# Patient Record
Sex: FEMALE | Race: WHITE | NOT HISPANIC OR LATINO | Employment: OTHER | URBAN - METROPOLITAN AREA
[De-identification: names, ages, dates, MRNs, and addresses within clinical notes are randomized per-mention and may not be internally consistent; named-entity substitution may affect disease eponyms.]

---

## 2017-05-24 ENCOUNTER — OFFICE VISIT (OUTPATIENT)
Dept: FAMILY MEDICINE | Facility: CLINIC | Age: 82
End: 2017-05-24
Payer: MEDICARE

## 2017-05-24 VITALS
BODY MASS INDEX: 25.9 KG/M2 | TEMPERATURE: 98 F | WEIGHT: 146.19 LBS | DIASTOLIC BLOOD PRESSURE: 70 MMHG | HEART RATE: 74 BPM | OXYGEN SATURATION: 97 % | SYSTOLIC BLOOD PRESSURE: 140 MMHG | RESPIRATION RATE: 16 BRPM | HEIGHT: 63 IN

## 2017-05-24 DIAGNOSIS — M54.50 CHRONIC MIDLINE LOW BACK PAIN WITHOUT SCIATICA: ICD-10-CM

## 2017-05-24 DIAGNOSIS — K58.0 IRRITABLE BOWEL SYNDROME WITH DIARRHEA: ICD-10-CM

## 2017-05-24 DIAGNOSIS — G89.29 CHRONIC MIDLINE LOW BACK PAIN WITHOUT SCIATICA: ICD-10-CM

## 2017-05-24 DIAGNOSIS — I10 HYPERTENSION, WELL CONTROLLED: Primary | ICD-10-CM

## 2017-05-24 PROCEDURE — 99203 OFFICE O/P NEW LOW 30 MIN: CPT | Mod: PBBFAC,PO | Performed by: FAMILY MEDICINE

## 2017-05-24 PROCEDURE — 99204 OFFICE O/P NEW MOD 45 MIN: CPT | Mod: S$PBB,,, | Performed by: FAMILY MEDICINE

## 2017-05-24 PROCEDURE — 99999 PR PBB SHADOW E&M-NEW PATIENT-LVL III: CPT | Mod: PBBFAC,,, | Performed by: FAMILY MEDICINE

## 2017-05-24 RX ORDER — OXYBUTYNIN CHLORIDE 5 MG/1
5 TABLET, EXTENDED RELEASE ORAL DAILY
Qty: 30 TABLET | Refills: 3 | Status: SHIPPED | OUTPATIENT
Start: 2017-05-24 | End: 2017-07-11

## 2017-05-24 RX ORDER — AMLODIPINE BESYLATE 10 MG/1
10 TABLET ORAL DAILY
COMMUNITY
End: 2017-12-21

## 2017-05-24 NOTE — PROGRESS NOTES
Subjective:       Patient ID: Ana Johnston is a 86 y.o. female.    Chief Complaint: Hypertension (est care); Polyuria; Irritable Bowel Syndrome; Osteoporosis; and Arthritis    HPI    Pt is here today to establish care    HTN - chronic - adherent with her medications - No need of refills.     OA - chronic - Most of which is in her low back, which is reasonably well controlled with prn Tylenol.     IBS - chronic - diarrhea type -  pt takes int lomotil with good effect. Once per week is the average frequency.      Urinary frequency - pt urinates many times per day for many months. Worsening over the last half a year. She was on medicine in the past for this, but she is out. She cannot remember what the name of the medicine.     No current outpatient prescriptions on file prior to visit.     No current facility-administered medications on file prior to visit.        Past Medical History:   Diagnosis Date    Anemia     Anxiety     Arthritis     Cataract     Disorder of kidney and ureter     Encounter for blood transfusion     Hypertension     Osteoporosis     Polyuria     Thyroid disease        Family History   Problem Relation Age of Onset    Arthritis Mother     Thyroid disease Mother     Heart attack Father     Diabetes Father     Liver disease Father     Arthritis Sister     Osteoporosis Sister     Hypertension Sister     Early death Brother     Asthma Son     Tuberculosis Maternal Grandmother     Early death Sister     Thyroid disease Other         reports that she has quit smoking. She does not have any smokeless tobacco history on file. She reports that she drinks about 1.8 oz of alcohol per week . She reports that she does not use drugs.    Review of Systems   Constitutional: Negative for chills and fever.   HENT: Negative for congestion, ear pain, hearing loss, rhinorrhea and sore throat.    Eyes: Negative for pain and discharge.   Respiratory: Negative for cough and shortness of breath.     Cardiovascular: Negative for chest pain and palpitations.   Gastrointestinal: Negative for diarrhea, nausea and vomiting.   Genitourinary: Negative for difficulty urinating, dysuria and frequency.   Allergic/Immunologic: Negative for environmental allergies and food allergies.   Neurological: Negative for seizures and weakness.   Psychiatric/Behavioral: Negative for dysphoric mood. The patient is not nervous/anxious.        Objective:     Vitals:    05/24/17 1413   BP: (!) 140/70   Pulse: 74   Resp: 16   Temp: 98.3 °F (36.8 °C)        Physical Exam   Constitutional: She appears well-developed. No distress.   HENT:   Head: Normocephalic and atraumatic.   Eyes: Conjunctivae are normal. Right eye exhibits no discharge. Left eye exhibits no discharge. No scleral icterus.   Cardiovascular: Normal rate, regular rhythm and normal heart sounds.  Exam reveals no gallop and no friction rub.    No murmur heard.  Pulmonary/Chest: Effort normal and breath sounds normal. No respiratory distress. She has no wheezes. She has no rales.   Neurological: She is alert.   Skin: She is not diaphoretic.   Psychiatric: She has a normal mood and affect.   Vitals reviewed.      Assessment:       1. Hypertension, well controlled    2. Chronic midline low back pain without sciatica    3. Irritable bowel syndrome with diarrhea        Plan:       Ana was seen today for hypertension, polyuria, irritable bowel syndrome, osteoporosis and arthritis.    Diagnoses and all orders for this visit:    Hypertension, well controlled  - Chronic - stable     Pt is doing well on current therapy. No side effects noted. Will continue current therapy.    Chronic midline low back pain without sciatica  - Pt instructed that they can take Tylenol, or acetaminophen for their pain. They can take up to 3,000mg per day, or 6 tablets, with a max of two tablets at a time,  as long as they do not have any liver disease.  Pt to let me know if tylenol is not sufficient for  pain control.     Irritable bowel syndrome with diarrhea  - Chronic - stable     Pt is doing well on current therapy. No side effects noted. Will continue current therapy.    Other orders  -     oxybutynin (DITROPAN-XL) 5 MG TR24; Take 1 tablet (5 mg total) by mouth once daily.  Hx and pex suggests the above dx. Treatment as above. Pt education given during the visit and a patient education handout was given. P t warned about potential dizziness as a side effect.             Return in 4 weeks (on 6/21/2017) for Annual Physical.        Pt verbalized understanding and agreed with our plan.

## 2017-06-02 ENCOUNTER — TELEPHONE (OUTPATIENT)
Dept: FAMILY MEDICINE | Facility: CLINIC | Age: 82
End: 2017-06-02

## 2017-06-02 NOTE — TELEPHONE ENCOUNTER
----- Message from Julissa Trujillo sent at 6/2/2017  4:42 PM CDT -----  Contact: self  Pt is requesting a refill for Sig - Route: Take by mouth 2 (two) times daily. - Oral. Please call pt @ [phone]. Pt is currently out.          Thanks

## 2017-06-22 ENCOUNTER — LAB VISIT (OUTPATIENT)
Dept: LAB | Facility: HOSPITAL | Age: 82
End: 2017-06-22
Attending: FAMILY MEDICINE
Payer: MEDICARE

## 2017-06-22 ENCOUNTER — OFFICE VISIT (OUTPATIENT)
Dept: FAMILY MEDICINE | Facility: CLINIC | Age: 82
End: 2017-06-22
Payer: MEDICARE

## 2017-06-22 VITALS
TEMPERATURE: 98 F | BODY MASS INDEX: 25.27 KG/M2 | WEIGHT: 142.63 LBS | SYSTOLIC BLOOD PRESSURE: 110 MMHG | HEIGHT: 63 IN | HEART RATE: 56 BPM | RESPIRATION RATE: 16 BRPM | OXYGEN SATURATION: 96 % | DIASTOLIC BLOOD PRESSURE: 70 MMHG

## 2017-06-22 DIAGNOSIS — R53.83 FATIGUE, UNSPECIFIED TYPE: ICD-10-CM

## 2017-06-22 DIAGNOSIS — I48.0 AF (PAROXYSMAL ATRIAL FIBRILLATION): ICD-10-CM

## 2017-06-22 DIAGNOSIS — Z00.00 ANNUAL PHYSICAL EXAM: ICD-10-CM

## 2017-06-22 DIAGNOSIS — R23.2 HOT FLASHES: ICD-10-CM

## 2017-06-22 DIAGNOSIS — E21.0 PRIMARY HYPERPARATHYROIDISM: ICD-10-CM

## 2017-06-22 DIAGNOSIS — Z00.00 ANNUAL PHYSICAL EXAM: Primary | ICD-10-CM

## 2017-06-22 DIAGNOSIS — K58.0 IRRITABLE BOWEL SYNDROME WITH DIARRHEA: ICD-10-CM

## 2017-06-22 LAB
ALBUMIN SERPL BCP-MCNC: 3.8 G/DL
ALP SERPL-CCNC: 52 U/L
ALT SERPL W/O P-5'-P-CCNC: 17 U/L
ANION GAP SERPL CALC-SCNC: 9 MMOL/L
AST SERPL-CCNC: 25 U/L
BASOPHILS # BLD AUTO: 0.02 K/UL
BASOPHILS NFR BLD: 0.2 %
BILIRUB SERPL-MCNC: 0.9 MG/DL
BUN SERPL-MCNC: 34 MG/DL
CALCIUM SERPL-MCNC: 10.6 MG/DL
CHLORIDE SERPL-SCNC: 101 MMOL/L
CO2 SERPL-SCNC: 30 MMOL/L
CREAT SERPL-MCNC: 1.1 MG/DL
DIFFERENTIAL METHOD: NORMAL
EOSINOPHIL # BLD AUTO: 0.1 K/UL
EOSINOPHIL NFR BLD: 1.1 %
ERYTHROCYTE [DISTWIDTH] IN BLOOD BY AUTOMATED COUNT: 14.2 %
EST. GFR  (AFRICAN AMERICAN): 52.5 ML/MIN/1.73 M^2
EST. GFR  (NON AFRICAN AMERICAN): 45.6 ML/MIN/1.73 M^2
GLUCOSE SERPL-MCNC: 100 MG/DL
HCT VFR BLD AUTO: 42.1 %
HGB BLD-MCNC: 13.7 G/DL
LYMPHOCYTES # BLD AUTO: 2.1 K/UL
LYMPHOCYTES NFR BLD: 25.7 %
MCH RBC QN AUTO: 30.8 PG
MCHC RBC AUTO-ENTMCNC: 32.5 %
MCV RBC AUTO: 95 FL
MONOCYTES # BLD AUTO: 0.9 K/UL
MONOCYTES NFR BLD: 10.5 %
NEUTROPHILS # BLD AUTO: 5 K/UL
NEUTROPHILS NFR BLD: 62.3 %
PLATELET # BLD AUTO: 236 K/UL
PMV BLD AUTO: 11.5 FL
POTASSIUM SERPL-SCNC: 4.7 MMOL/L
PROT SERPL-MCNC: 7 G/DL
PTH-INTACT SERPL-MCNC: 56 PG/ML
RBC # BLD AUTO: 4.45 M/UL
SODIUM SERPL-SCNC: 140 MMOL/L
TSH SERPL DL<=0.005 MIU/L-ACNC: 0.98 UIU/ML
WBC # BLD AUTO: 8.08 K/UL

## 2017-06-22 PROCEDURE — 99397 PER PM REEVAL EST PAT 65+ YR: CPT | Mod: S$PBB,,, | Performed by: FAMILY MEDICINE

## 2017-06-22 PROCEDURE — 84443 ASSAY THYROID STIM HORMONE: CPT

## 2017-06-22 PROCEDURE — 99999 PR PBB SHADOW E&M-EST. PATIENT-LVL IV: CPT | Mod: PBBFAC,,, | Performed by: FAMILY MEDICINE

## 2017-06-22 PROCEDURE — 36415 COLL VENOUS BLD VENIPUNCTURE: CPT | Mod: PO

## 2017-06-22 PROCEDURE — 83970 ASSAY OF PARATHORMONE: CPT

## 2017-06-22 PROCEDURE — 80053 COMPREHEN METABOLIC PANEL: CPT

## 2017-06-22 PROCEDURE — 85025 COMPLETE CBC W/AUTO DIFF WBC: CPT

## 2017-06-22 RX ORDER — LOSARTAN POTASSIUM AND HYDROCHLOROTHIAZIDE 12.5; 5 MG/1; MG/1
1 TABLET ORAL DAILY
COMMUNITY
End: 2017-08-22 | Stop reason: SDUPTHER

## 2017-06-22 RX ORDER — GLYCERIN 1 G/1
1 SUPPOSITORY RECTAL
COMMUNITY
End: 2019-07-30 | Stop reason: SDUPTHER

## 2017-06-22 RX ORDER — DIPHENOXYLATE HYDROCHLORIDE AND ATROPINE SULFATE 2.5; .025 MG/1; MG/1
1 TABLET ORAL 4 TIMES DAILY PRN
COMMUNITY
End: 2019-01-14

## 2017-06-22 NOTE — PROGRESS NOTES
Subjective:       Patient ID: Ana Johnston is a 86 y.o. female.    Chief Complaint: Annual Exam and Diarrhea (off and on for years )    HPI    'hot flash' - pt experiences this 1-2 x per month x 5-10 seconds. This does not greatly trouble her.     IBS-D - Pt was diagnosed 10+ years ago. Lomotil does help, but alcohol worsens her sxs, but not consistently. This negatively affects her lifestyle.  She drinks a glass of wine about 2-3 x per week.     Positive depression screen - pt denies depressed, but does have rare anxiety. This does not bother her typically. If it does bother her, she will let me know.     Current Outpatient Prescriptions on File Prior to Visit   Medication Sig Dispense Refill    ACETAMINOPHEN (TYLENOL ORAL) Take by mouth 2 (two) times daily as needed.       amlodipine (NORVASC) 10 MG tablet Take 10 mg by mouth once daily.      ATENOLOL ORAL Take 25 mg by mouth 2 (two) times daily.       FOLIC ACID/MULTIVIT-MIN/LUTEIN (CENTRUM SILVER ORAL) Take by mouth once daily at 6am.      GABAPENTIN ORAL Take 300 mg by mouth. Take 1 tablet 1 hours before bedtime      oxybutynin (DITROPAN-XL) 5 MG TR24 Take 1 tablet (5 mg total) by mouth once daily. 30 tablet 3    [DISCONTINUED] COLCHICINE ORAL Take by mouth once daily at 6am.      [DISCONTINUED] LOSARTAN-HYDROCHLOROTHIAZIDE ORAL Take by mouth once daily at 6am.       No current facility-administered medications on file prior to visit.        Past Medical History:   Diagnosis Date    AF (paroxysmal atrial fibrillation) 6/22/2017    Anemia     Anxiety     Arthritis     Cataract     Disorder of kidney and ureter     Encounter for blood transfusion     Hypertension     Osteoporosis     Polyuria     Thyroid disease        Family History   Problem Relation Age of Onset    Arthritis Mother     Thyroid disease Mother     Heart attack Father     Diabetes Father     Liver disease Father     Arthritis Sister     Osteoporosis Sister      Hypertension Sister     Early death Brother     Asthma Son     Tuberculosis Maternal Grandmother     Early death Sister     Thyroid disease Other         reports that she has quit smoking. She does not have any smokeless tobacco history on file. She reports that she drinks about 1.8 oz of alcohol per week . She reports that she does not use drugs.    Review of Systems   Constitutional: Negative for chills and fever.   HENT: Negative for congestion, ear pain, hearing loss, rhinorrhea and sore throat.    Eyes: Negative for pain and discharge.   Respiratory: Negative for cough and shortness of breath.    Cardiovascular: Negative for chest pain and palpitations.   Gastrointestinal: Positive for diarrhea. Negative for nausea and vomiting.   Genitourinary: Negative for difficulty urinating, dysuria and frequency.   Allergic/Immunologic: Negative for environmental allergies and food allergies.   Neurological: Negative for seizures and weakness.   Psychiatric/Behavioral: Negative for dysphoric mood. The patient is not nervous/anxious.        Objective:     Vitals:    06/22/17 1032   BP: 110/70   Pulse: (!) 56   Resp: 16   Temp: 97.7 °F (36.5 °C)        Physical Exam   Constitutional: She appears well-developed. No distress.   HENT:   Head: Normocephalic and atraumatic.   Eyes: Conjunctivae are normal. Right eye exhibits no discharge. Left eye exhibits no discharge. No scleral icterus.   Cardiovascular: Normal rate, regular rhythm and normal heart sounds.  Exam reveals no gallop and no friction rub.    No murmur heard.  Pulmonary/Chest: Effort normal and breath sounds normal. No respiratory distress. She has no wheezes. She has no rales.   Musculoskeletal: She exhibits edema (trace bilat).   Neurological: She is alert.   antalgic gait, walks with rolling walker.    Skin: She is not diaphoretic.   Psychiatric: She has a normal mood and affect.   Vitals reviewed.      Assessment:       1. Annual physical exam    2.  Irritable bowel syndrome with diarrhea    3. AF (paroxysmal atrial fibrillation)    4. Primary hyperparathyroidism    5. Hot flashes    6. Fatigue, unspecified type        Plan:       Ana was seen today for annual exam and diarrhea.    Diagnoses and all orders for this visit:    Annual physical exam  -     Comprehensive metabolic panel; Future  Counseled on age appropriate medical preventative services, including age appropriate cancer screenings, over all nutritional health, need for a consistent exercise regimen and an over all push towards maintaining a vigorous and active lifestyle.      Counseled on age appropriate vaccines and discussed upcoming health care needs based on age/gender.  Spent time with patient counseling on need for a good patient/doctor relationship moving forward.        PT will inquire about vaccines. By our records she needs prevnar 13.      Irritable bowel syndrome with diarrhea  Advised cessation of alcohol as this may be increasing her diarrhea.     AF (paroxysmal atrial fibrillation)  -     Ambulatory referral to Cardiology    Primary hyperparathyroidism  -     CBC auto differential; Future  -     TSH; Future  -     PTH, intact; Future    Hot flashes  -     TSH; Future    Fatigue, unspecified type  -     TSH; Future            Return in about 3 months (around 9/22/2017) for diarrhea,.      Pt verbalized understanding and agreed with our plan.

## 2017-06-23 ENCOUNTER — TELEPHONE (OUTPATIENT)
Dept: FAMILY MEDICINE | Facility: CLINIC | Age: 82
End: 2017-06-23

## 2017-06-23 DIAGNOSIS — E83.52 HYPERCALCEMIA: Primary | ICD-10-CM

## 2017-06-23 NOTE — TELEPHONE ENCOUNTER
----- Message from Tc Lemus MD sent at 6/23/2017  9:23 AM CDT -----  Please notify patient results are abnormal as her calcium was a bit high. Please have her come in for rechek  Lab only in 2 weeks.  Thanks.

## 2017-07-05 ENCOUNTER — OFFICE VISIT (OUTPATIENT)
Dept: CARDIOLOGY | Facility: CLINIC | Age: 82
End: 2017-07-05
Payer: MEDICARE

## 2017-07-05 VITALS
WEIGHT: 137.56 LBS | HEART RATE: 70 BPM | HEIGHT: 63 IN | OXYGEN SATURATION: 97 % | SYSTOLIC BLOOD PRESSURE: 134 MMHG | DIASTOLIC BLOOD PRESSURE: 72 MMHG | BODY MASS INDEX: 24.38 KG/M2

## 2017-07-05 DIAGNOSIS — I10 ESSENTIAL HYPERTENSION: ICD-10-CM

## 2017-07-05 DIAGNOSIS — I10 HYPERTENSION: ICD-10-CM

## 2017-07-05 DIAGNOSIS — I48.0 AF (PAROXYSMAL ATRIAL FIBRILLATION): Primary | ICD-10-CM

## 2017-07-05 PROCEDURE — 1159F MED LIST DOCD IN RCRD: CPT | Mod: ,,, | Performed by: INTERNAL MEDICINE

## 2017-07-05 PROCEDURE — 99999 PR PBB SHADOW E&M-EST. PATIENT-LVL III: CPT | Mod: PBBFAC,,, | Performed by: INTERNAL MEDICINE

## 2017-07-05 PROCEDURE — 1126F AMNT PAIN NOTED NONE PRSNT: CPT | Mod: ,,, | Performed by: INTERNAL MEDICINE

## 2017-07-05 PROCEDURE — 99204 OFFICE O/P NEW MOD 45 MIN: CPT | Mod: S$PBB,,, | Performed by: INTERNAL MEDICINE

## 2017-07-05 PROCEDURE — 99213 OFFICE O/P EST LOW 20 MIN: CPT | Mod: PBBFAC | Performed by: INTERNAL MEDICINE

## 2017-07-05 PROCEDURE — 93005 ELECTROCARDIOGRAM TRACING: CPT | Mod: PBBFAC | Performed by: INTERNAL MEDICINE

## 2017-07-05 PROCEDURE — 93010 ELECTROCARDIOGRAM REPORT: CPT | Mod: S$PBB,,, | Performed by: INTERNAL MEDICINE

## 2017-07-05 RX ORDER — ASPIRIN 325 MG
325 TABLET ORAL DAILY
Refills: 0 | COMMUNITY
Start: 2017-07-05 | End: 2019-07-30 | Stop reason: SDUPTHER

## 2017-07-05 NOTE — LETTER
July 5, 2017      Tc Lemus MD  [address] [facility] [address]           [place] [facility] [address] [facility] [place] [address]  Phone: [phone]          Patient: Ana Johnston   MR Number: [id number]   YOB: 1930   Date of Visit: 7/5/2017       Dear Dr. Tc Lemus:    Thank you for referring Ana Johnston to me for evaluation. Attached you will find relevant portions of my assessment and plan of care.    If you have questions, please do not hesitate to call me. I look forward to following Ana Johnston along with you.    Sincerely,    Junior Garcia MD    Enclosure  CC:  No Recipients    If you would like to receive this communication electronically, please contact [email or web address] or [phone] to request more information on [facility] access.    For providers and/or their staff who would like to refer a patient to [facility], please contact us through our one-stop-shop provider referral line, [facility], at [phone].    If you feel you have received this communication in error or would no longer like to receive these types of communications, please e-mail [email or web address]

## 2017-07-05 NOTE — PROGRESS NOTES
Subjective:    Patient ID:  Ana Johnston is a 86 y.o. female who presents for follow-up of Atrial Fibrillation      HPI   HTN, PAF    Recently moved from [place]  Referred by Dr Lemus  'hot flash' - pt experiences this 1-2 x per month x 5-10 seconds. This does not greatly trouble her.      IBS-D - Pt was diagnosed 10+ years ago. Lomotil does help, but alcohol worsens her sxs, but not consistently. This negatively affects her lifestyle.  She drinks a glass of wine about 2-3 x per week.      Positive depression screen - pt denies depressed, but does have rare anxiety. This does not bother her typically. If it does bother her, she will let me know.     Denies CP or SOB  Reports PAF was Dx about 4-5 years ago. Old records show holter 2013 with A-fib  Denies being on OAC    EKG NSR RBBB PVCs       Review of Systems   Constitution: Negative for decreased appetite.   HENT: Negative for ear discharge.    Eyes: Negative for blurred vision.   Respiratory: Negative for hemoptysis.    Endocrine: Negative for polyphagia.   Hematologic/Lymphatic: Negative for adenopathy.   Skin: Negative for color change.   Musculoskeletal: Negative for joint swelling.   Neurological: Negative for brief paralysis.   Psychiatric/Behavioral: Negative for hallucinations.        Objective:    Physical Exam   Constitutional: She is oriented to person, place, and time. She appears well-developed and well-nourished.   HENT:   Head: Normocephalic and atraumatic.   Eyes: Conjunctivae are normal. Pupils are equal, round, and reactive to light.   Neck: Normal range of motion. Neck supple.   Cardiovascular: Normal rate, normal heart sounds and intact distal pulses.    Pulmonary/Chest: Effort normal and breath sounds normal.   Abdominal: Soft. Bowel sounds are normal.   Musculoskeletal: Normal range of motion.   Neurological: She is alert and oriented to person, place, and time.   Skin: Skin is warm and dry.         Assessment:       1. AF  (paroxysmal atrial fibrillation)    2. Essential hypertension         Plan:       Discussed OAC - she would prefer  qd alone  Echo and holter  Currently NSR with PVCs

## 2017-07-07 ENCOUNTER — LAB VISIT (OUTPATIENT)
Dept: LAB | Facility: HOSPITAL | Age: 82
End: 2017-07-07
Attending: FAMILY MEDICINE
Payer: MEDICARE

## 2017-07-07 DIAGNOSIS — E83.52 HYPERCALCEMIA: ICD-10-CM

## 2017-07-07 LAB
CA-I BLDV-SCNC: 1.33 MMOL/L
CALCIUM SERPL-MCNC: 10.2 MG/DL

## 2017-07-07 PROCEDURE — 82330 ASSAY OF CALCIUM: CPT

## 2017-07-07 PROCEDURE — 36415 COLL VENOUS BLD VENIPUNCTURE: CPT | Mod: PO

## 2017-07-07 PROCEDURE — 82310 ASSAY OF CALCIUM: CPT

## 2017-07-10 ENCOUNTER — TELEPHONE (OUTPATIENT)
Dept: FAMILY MEDICINE | Facility: CLINIC | Age: 82
End: 2017-07-10

## 2017-07-10 NOTE — TELEPHONE ENCOUNTER
----- Message from Juan Pope sent at 7/10/2017  2:36 PM CDT -----  Contact: Son-Antolin   Pt's son has questions regarding an after visit summary pt received on 6/22 visit. Son can be reached @ [phone].

## 2017-07-10 NOTE — TELEPHONE ENCOUNTER
Patient son called stated pt is not getting better with loose stool. Patient scheduled for tomorrow

## 2017-07-11 ENCOUNTER — OFFICE VISIT (OUTPATIENT)
Dept: FAMILY MEDICINE | Facility: CLINIC | Age: 82
End: 2017-07-11
Payer: MEDICARE

## 2017-07-11 VITALS
BODY MASS INDEX: 25.39 KG/M2 | TEMPERATURE: 98 F | DIASTOLIC BLOOD PRESSURE: 80 MMHG | RESPIRATION RATE: 16 BRPM | HEART RATE: 64 BPM | HEIGHT: 63 IN | WEIGHT: 143.31 LBS | SYSTOLIC BLOOD PRESSURE: 150 MMHG | OXYGEN SATURATION: 96 %

## 2017-07-11 DIAGNOSIS — K58.0 IRRITABLE BOWEL SYNDROME WITH DIARRHEA: Primary | ICD-10-CM

## 2017-07-11 DIAGNOSIS — Z13.6 SCREENING FOR CARDIOVASCULAR CONDITION: ICD-10-CM

## 2017-07-11 DIAGNOSIS — R35.89 POLYURIA: ICD-10-CM

## 2017-07-11 PROCEDURE — 99214 OFFICE O/P EST MOD 30 MIN: CPT | Mod: PBBFAC,PO | Performed by: FAMILY MEDICINE

## 2017-07-11 PROCEDURE — 1159F MED LIST DOCD IN RCRD: CPT | Mod: ,,, | Performed by: FAMILY MEDICINE

## 2017-07-11 PROCEDURE — 99214 OFFICE O/P EST MOD 30 MIN: CPT | Mod: S$PBB,,, | Performed by: FAMILY MEDICINE

## 2017-07-11 PROCEDURE — 1126F AMNT PAIN NOTED NONE PRSNT: CPT | Mod: ,,, | Performed by: FAMILY MEDICINE

## 2017-07-11 PROCEDURE — 99999 PR PBB SHADOW E&M-EST. PATIENT-LVL IV: CPT | Mod: PBBFAC,,, | Performed by: FAMILY MEDICINE

## 2017-07-11 RX ORDER — OXYBUTYNIN CHLORIDE 15 MG/1
15 TABLET, EXTENDED RELEASE ORAL DAILY
Qty: 30 TABLET | Refills: 1 | Status: SHIPPED | OUTPATIENT
Start: 2017-07-11 | End: 2017-07-25

## 2017-07-11 NOTE — PROGRESS NOTES
Subjective:       Patient ID: Ana Johnston is a 86 y.o. female.    Chief Complaint: Diarrhea    HPI    IBS - D - She is taking lomotil about 3x per day to treat her sxs. Usually by time she takes the 3rd lomotil after her third diarrhea episode, she no longer has any episodes for the remainder of the day.     Polyuria - Pt has increasing problems with polyuria.  She is on oxybutinin 5mg, but this has not made any difference thatt she can tell. She states that she urinates multiple times per hour.     She currently get up 4-5 x at night to urinate.     Her old urologist gave her an unknown medication sample which seemed to work well.       Current Outpatient Prescriptions on File Prior to Visit   Medication Sig Dispense Refill    ACETAMINOPHEN (TYLENOL ORAL) Take by mouth 2 (two) times daily as needed.       amlodipine (NORVASC) 10 MG tablet Take 10 mg by mouth once daily.      aspirin 325 MG tablet Take 1 tablet (325 mg total) by mouth once daily.  0    ATENOLOL ORAL Take 25 mg by mouth 2 (two) times daily.       diphenoxylate-atropine 2.5-0.025 mg (LOMOTIL) 2.5-0.025 mg per tablet Take 1 tablet by mouth 4 (four) times daily as needed for Diarrhea.      FOLIC ACID/MULTIVIT-MIN/LUTEIN (CENTRUM SILVER ORAL) Take by mouth once daily at 6am.      GABAPENTIN ORAL Take 300 mg by mouth. Take 1 tablet 1 hours before bedtime      losartan-hydrochlorothiazide 50-12.5 mg (HYZAAR) 50-12.5 mg per tablet Take 1 tablet by mouth once daily.      [DISCONTINUED] oxybutynin (DITROPAN-XL) 5 MG TR24 Take 1 tablet (5 mg total) by mouth once daily. 30 tablet 3    glycerin adult suppository Place 1 suppository rectally as needed for Constipation.       No current facility-administered medications on file prior to visit.        Past Medical History:   Diagnosis Date    AF (paroxysmal atrial fibrillation) 6/22/2017    Anemia     Anxiety     Arthritis     Cataract     Disorder of kidney and ureter     Encounter for blood  transfusion     Hypertension     Osteoporosis     Polyuria     Thyroid disease        Family History   Problem Relation Age of Onset    Arthritis Mother     Thyroid disease Mother     Heart attack Father     Diabetes Father     Liver disease Father     Arthritis Sister     Osteoporosis Sister     Hypertension Sister     Early death Brother     Asthma Son     Tuberculosis Maternal Grandmother     Early death Sister     Thyroid disease Other         reports that she has quit smoking. She has quit using smokeless tobacco. She reports that she drinks about 1.8 oz of alcohol per week . She reports that she does not use drugs.    Review of Systems   Gastrointestinal: Positive for abdominal pain and diarrhea.   Genitourinary: Positive for frequency and urgency. Negative for dysuria.   Musculoskeletal: Positive for back pain and gait problem.       Objective:     Vitals:    07/11/17 1608   BP: (!) 150/80   Pulse: 64   Resp: 16   Temp: 98.2 °F (36.8 °C)        Physical Exam   Constitutional: She appears well-developed. No distress.   HENT:   Head: Normocephalic and atraumatic.   [place]   Eyes: Conjunctivae are normal. No scleral icterus.   Pulmonary/Chest: Effort normal.   Neurological: She is alert.   Kyphotic with unstable gait, uses rolling walker.    Skin: She is not diaphoretic.   Psychiatric: She has a normal mood and affect. Her behavior is normal.   Vitals reviewed.      Assessment:       1. Irritable bowel syndrome with diarrhea    2. Polyuria    3. Screening for cardiovascular condition        Plan:       Ana was seen today for diarrhea.    Diagnoses and all orders for this visit:    Irritable bowel syndrome with diarrhea  -     Ambulatory consult to Gastroenterology   Will try a few changes:  1. Handout given for FODMAP reduction diet.   2. Taking lomotil at breakfast and Lunch as ppx instead of reactive.  3. Refer to GI.     Polyuria  -     oxybutynin (DITROPAN XL) 15 MG TR24; Take 1 tablet (15 mg  total) by mouth once daily.  Pt still has significant polyuria. Will increase ditropan from 5->15mg.     Her son will call her old urologist and figure out which medication he was giving her samples for which seemed to work well.     Screening for cardiovascular condition  -     Lipid panel; Future            Return in about 3 weeks (around 8/1/2017).        Pt verbalized understanding and agreed with our plan.

## 2017-07-19 ENCOUNTER — TELEPHONE (OUTPATIENT)
Dept: FAMILY MEDICINE | Facility: CLINIC | Age: 82
End: 2017-07-19

## 2017-07-19 ENCOUNTER — PATIENT MESSAGE (OUTPATIENT)
Dept: FAMILY MEDICINE | Facility: CLINIC | Age: 82
End: 2017-07-19

## 2017-07-19 DIAGNOSIS — R35.0 URINARY FREQUENCY: Primary | ICD-10-CM

## 2017-07-19 NOTE — TELEPHONE ENCOUNTER
----- Message from Monserrat Dyer sent at 7/18/2017  4:22 PM CDT -----  Contact: Son - Antolin Johnston  Patient's son says he need to discuss patient's care and referrals for other doctors with the doctor. Please call  Antolin at  [phone]

## 2017-07-19 NOTE — TELEPHONE ENCOUNTER
pts son states he was out of town for last OV and wants to know if she needs to go to upcoming appointments; I informed him those appointments were for cardiac testing and f/u with cardiologist, he verbalized understanding; also states referral was supposed to be place for GI; informed him referral was placed, I will follow-up and have them call him to schedule appointment; I placed call to [facility] and was told they did not receive referral; I faxed referral to [phone] per there request

## 2017-07-25 ENCOUNTER — TELEPHONE (OUTPATIENT)
Dept: FAMILY MEDICINE | Facility: CLINIC | Age: 82
End: 2017-07-25

## 2017-07-25 ENCOUNTER — PATIENT MESSAGE (OUTPATIENT)
Dept: FAMILY MEDICINE | Facility: CLINIC | Age: 82
End: 2017-07-25

## 2017-07-25 RX ORDER — SOLIFENACIN SUCCINATE 10 MG/1
10 TABLET, FILM COATED ORAL DAILY
Qty: 30 TABLET | Refills: 1 | Status: SHIPPED | OUTPATIENT
Start: 2017-07-25 | End: 2017-08-22

## 2017-07-25 NOTE — TELEPHONE ENCOUNTER
----- Message from Juan Pope sent at 7/25/2017 11:15 AM CDT -----  Contact: [facility]Tacy G[facility]  Called to request copy of pt's recent labs. Please fax to [phone].

## 2017-07-26 ENCOUNTER — HOSPITAL ENCOUNTER (OUTPATIENT)
Dept: CARDIOLOGY | Facility: HOSPITAL | Age: 82
Discharge: HOME | End: 2017-07-26
Attending: INTERNAL MEDICINE
Payer: MEDICARE

## 2017-07-26 DIAGNOSIS — I10 ESSENTIAL HYPERTENSION: ICD-10-CM

## 2017-07-26 DIAGNOSIS — I48.0 AF (PAROXYSMAL ATRIAL FIBRILLATION): ICD-10-CM

## 2017-07-26 LAB
DIASTOLIC DYSFUNCTION: YES
ESTIMATED PA SYSTOLIC PRESSURE: 64.55
GLOBAL PERICARDIAL EFFUSION: ABNORMAL
MITRAL VALVE MOBILITY: NORMAL
MITRAL VALVE REGURGITATION: ABNORMAL
RETIRED EF AND QEF - SEE NOTES: 55 (ref 55–65)
TRICUSPID VALVE REGURGITATION: ABNORMAL

## 2017-07-26 PROCEDURE — 93306 TTE W/DOPPLER COMPLETE: CPT

## 2017-07-26 PROCEDURE — 93225 XTRNL ECG REC<48 HRS REC: CPT

## 2017-07-26 PROCEDURE — 93306 TTE W/DOPPLER COMPLETE: CPT | Mod: 26,,, | Performed by: INTERNAL MEDICINE

## 2017-07-26 PROCEDURE — 93227 XTRNL ECG REC<48 HR R&I: CPT | Mod: ,,, | Performed by: INTERNAL MEDICINE

## 2017-07-28 ENCOUNTER — PATIENT MESSAGE (OUTPATIENT)
Dept: FAMILY MEDICINE | Facility: CLINIC | Age: 82
End: 2017-07-28

## 2017-08-02 ENCOUNTER — OFFICE VISIT (OUTPATIENT)
Dept: CARDIOLOGY | Facility: CLINIC | Age: 82
End: 2017-08-02
Payer: MEDICARE

## 2017-08-02 VITALS
BODY MASS INDEX: 25.16 KG/M2 | DIASTOLIC BLOOD PRESSURE: 70 MMHG | WEIGHT: 142 LBS | HEART RATE: 53 BPM | SYSTOLIC BLOOD PRESSURE: 129 MMHG | OXYGEN SATURATION: 95 % | HEIGHT: 63 IN

## 2017-08-02 DIAGNOSIS — I10 HYPERTENSION, WELL CONTROLLED: ICD-10-CM

## 2017-08-02 DIAGNOSIS — I48.0 AF (PAROXYSMAL ATRIAL FIBRILLATION): Primary | ICD-10-CM

## 2017-08-02 PROCEDURE — 1159F MED LIST DOCD IN RCRD: CPT | Mod: ,,, | Performed by: INTERNAL MEDICINE

## 2017-08-02 PROCEDURE — 99999 PR PBB SHADOW E&M-EST. PATIENT-LVL III: CPT | Mod: PBBFAC,,, | Performed by: INTERNAL MEDICINE

## 2017-08-02 PROCEDURE — 99213 OFFICE O/P EST LOW 20 MIN: CPT | Mod: PBBFAC | Performed by: INTERNAL MEDICINE

## 2017-08-02 PROCEDURE — 99213 OFFICE O/P EST LOW 20 MIN: CPT | Mod: S$PBB,,, | Performed by: INTERNAL MEDICINE

## 2017-08-02 PROCEDURE — 1126F AMNT PAIN NOTED NONE PRSNT: CPT | Mod: ,,, | Performed by: INTERNAL MEDICINE

## 2017-08-02 NOTE — PROGRESS NOTES
Subjective:    Patient ID:  Ana Johnston is a 86 y.o. female who presents for follow-up of Atrial Fibrillation      HPI     HTN, PAF -  alone at patient request     Recently moved from [place]  Referred by Dr Lemus  'hot flash' - pt experiences this 1-2 x per month x 5-10 seconds. This does not greatly trouble her.      IBS-D - Pt was diagnosed 10+ years ago. Lomotil does help, but alcohol worsens her sxs, but not consistently. This negatively affects her lifestyle.  She drinks a glass of wine about 2-3 x per week.      Positive depression screen - pt denies depressed, but does have rare anxiety. This does not bother her typically. If it does bother her, she will let me know.      Denies CP or SOB  Reports PAF was Dx about 4-5 years ago. Old records show holter 2013 with A-fib    Discussed OAC - she would prefer  qd alone    Echo 7/26/17    1 - Normal left ventricular systolic function (EF 55-60%).     2 - No wall motion abnormalities.     3 - Concentric hypertrophy.     4 - Impaired LV relaxation, elevated LAP (grade 2 diastolic dysfunction).     5 - Trivial to mild mitral regurgitation (eccentric, anteriorly directed jet, degree of MR may be underestimated).     6 - Mild tricuspid regurgitation.     7 - Pulmonary hypertension. The estimated PA systolic pressure is 65 mmHg.     Holter 7/26/17  1. Sinus rhythm with heart rates varying between 34 and 126 bpm with an average of 67 bpm.     VENTRICULAR ARRHYTHMIAS  1. There were very frequent PVCs totalling 19921 and averaging 463 per hour.     2. There were no episodes of ventricular tachycardia.    SUPRA VENTRICULAR ARRHYTHMIAS  1. There were very frequent PACs totalling 5238 and averaging 218 per hour.     2. There were no episodes of sustained supraventricular tachycardia.    SINUS NODE FUNCTION  1. There was no evidence of high grade SA desean block.     AV CONDUCTION  1. There was no evidence of high grade AV block.     Denies CP or  SOB  Denies palpitations    Review of Systems   Constitution: Negative for decreased appetite.   HENT: Negative for ear discharge.    Eyes: Negative for blurred vision.   Respiratory: Negative for hemoptysis.    Endocrine: Negative for polyphagia.   Hematologic/Lymphatic: Negative for adenopathy.   Skin: Negative for color change.   Musculoskeletal: Negative for joint swelling.   Neurological: Negative for brief paralysis.   Psychiatric/Behavioral: Negative for hallucinations.        Objective:    Physical Exam   Constitutional: She is oriented to person, place, and time. She appears well-developed and well-nourished.   HENT:   Head: Normocephalic and atraumatic.   Eyes: Conjunctivae are normal. Pupils are equal, round, and reactive to light.   Neck: Normal range of motion. Neck supple.   Cardiovascular: Normal rate, normal heart sounds and intact distal pulses.    Pulmonary/Chest: Effort normal and breath sounds normal.   Abdominal: Soft. Bowel sounds are normal.   Musculoskeletal: Normal range of motion.   Neurological: She is alert and oriented to person, place, and time.   Skin: Skin is warm and dry.         Assessment:       1. AF (paroxysmal atrial fibrillation)    2. Hypertension, well controlled         Plan:       Cardiac stable  Frequent PVCs and PACs but asymptomatic with good LV function  OV 6 months

## 2017-08-22 ENCOUNTER — OFFICE VISIT (OUTPATIENT)
Dept: FAMILY MEDICINE | Facility: CLINIC | Age: 82
End: 2017-08-22
Payer: MEDICARE

## 2017-08-22 VITALS
RESPIRATION RATE: 16 BRPM | HEART RATE: 76 BPM | DIASTOLIC BLOOD PRESSURE: 70 MMHG | SYSTOLIC BLOOD PRESSURE: 156 MMHG | HEIGHT: 63 IN | BODY MASS INDEX: 25.43 KG/M2 | OXYGEN SATURATION: 95 % | TEMPERATURE: 99 F | WEIGHT: 143.5 LBS

## 2017-08-22 DIAGNOSIS — K59.01 SLOW TRANSIT CONSTIPATION: ICD-10-CM

## 2017-08-22 DIAGNOSIS — M15.9 GENERALIZED OA: ICD-10-CM

## 2017-08-22 DIAGNOSIS — R41.3 MEMORY LOSS: ICD-10-CM

## 2017-08-22 DIAGNOSIS — N32.81 OAB (OVERACTIVE BLADDER): Primary | ICD-10-CM

## 2017-08-22 DIAGNOSIS — F41.9 ANXIETY: ICD-10-CM

## 2017-08-22 DIAGNOSIS — F43.20 ADJUSTMENT DISORDER, UNSPECIFIED TYPE: ICD-10-CM

## 2017-08-22 PROCEDURE — 1126F AMNT PAIN NOTED NONE PRSNT: CPT | Mod: ,,, | Performed by: FAMILY MEDICINE

## 2017-08-22 PROCEDURE — 99215 OFFICE O/P EST HI 40 MIN: CPT | Mod: S$PBB,,, | Performed by: FAMILY MEDICINE

## 2017-08-22 PROCEDURE — 99999 PR PBB SHADOW E&M-EST. PATIENT-LVL III: CPT | Mod: PBBFAC,,, | Performed by: FAMILY MEDICINE

## 2017-08-22 PROCEDURE — 99213 OFFICE O/P EST LOW 20 MIN: CPT | Mod: PBBFAC,PO | Performed by: FAMILY MEDICINE

## 2017-08-22 PROCEDURE — 1159F MED LIST DOCD IN RCRD: CPT | Mod: ,,, | Performed by: FAMILY MEDICINE

## 2017-08-22 RX ORDER — SERTRALINE HYDROCHLORIDE 50 MG/1
50 TABLET, FILM COATED ORAL DAILY
Qty: 30 TABLET | Refills: 1 | Status: SHIPPED | OUTPATIENT
Start: 2017-08-22 | End: 2017-10-08 | Stop reason: SDUPTHER

## 2017-08-22 RX ORDER — LOSARTAN POTASSIUM AND HYDROCHLOROTHIAZIDE 12.5; 5 MG/1; MG/1
TABLET ORAL
Qty: 30 TABLET | Refills: 2 | Status: SHIPPED | OUTPATIENT
Start: 2017-08-22 | End: 2017-11-29 | Stop reason: SDUPTHER

## 2017-08-22 NOTE — PROGRESS NOTES
Subjective:       Patient ID: Ana Johnston is a 86 y.o. female.    Chief Complaint: Medication Management    HPI     Worse - Polyuria - Pt has tried oxybutinin and vesicare and neither have helped her polyuria. She gets up 3 times per night and urinates frequently throughout the day.     NEW - Anxiety  - pt feels that she is a bit more nervous and tearful than she was prior to her move from [place] This is impacting her relationship with her  and son.     OA - pt was on gabapentin, but she feels like it does not help.      NEW - Dementia - word finding difficulty - occasional issues with short term memory loss that seem to be worsening over the last 3 months or so. She is worried that she may have ALzheimer's, but also admits that If she did have it, she wouldn't want to know. No other associated sxs.     Current Outpatient Prescriptions on File Prior to Visit   Medication Sig Dispense Refill    ACETAMINOPHEN (TYLENOL ORAL) Take by mouth 2 (two) times daily as needed.       amlodipine (NORVASC) 10 MG tablet Take 10 mg by mouth once daily.      aspirin 325 MG tablet Take 1 tablet (325 mg total) by mouth once daily.  0    ATENOLOL ORAL Take 25 mg by mouth 2 (two) times daily.       diphenoxylate-atropine 2.5-0.025 mg (LOMOTIL) 2.5-0.025 mg per tablet Take 1 tablet by mouth 4 (four) times daily as needed for Diarrhea.      FOLIC ACID/MULTIVIT-MIN/LUTEIN (CENTRUM SILVER ORAL) Take by mouth once daily at 6am.      glycerin adult suppository Place 1 suppository rectally as needed for Constipation.      losartan-hydrochlorothiazide 50-12.5 mg (HYZAAR) 50-12.5 mg per tablet Take 1 tablet by mouth once daily.      [DISCONTINUED] GABAPENTIN ORAL Take 300 mg by mouth. Take 1 tablet 1 hours before bedtime      [DISCONTINUED] solifenacin (VESICARE) 10 MG tablet Take 1 tablet (10 mg total) by mouth once daily. 30 tablet 1     No current facility-administered medications on file prior to visit.        Past Medical  History:   Diagnosis Date    AF (paroxysmal atrial fibrillation) 6/22/2017    Anemia     Anxiety     Arthritis     Cataract     Disorder of kidney and ureter     Encounter for blood transfusion     Hypertension     OAB (overactive bladder) 8/22/2017    Osteoporosis     Polyuria     Thyroid disease        Family History   Problem Relation Age of Onset    Arthritis Mother     Thyroid disease Mother     Heart attack Father     Diabetes Father     Liver disease Father     Arthritis Sister     Osteoporosis Sister     Hypertension Sister     Early death Brother     Asthma Son     Tuberculosis Maternal Grandmother     Early death Sister     Thyroid disease Other         reports that she has quit smoking. She has never used smokeless tobacco. She reports that she drinks about 1.8 oz of alcohol per week . She reports that she does not use drugs.    Review of Systems   Genitourinary: Positive for frequency and urgency. Negative for dysuria.   Psychiatric/Behavioral: Positive for dysphoric mood. The patient is nervous/anxious.        Objective:     Vitals:    08/22/17 1514   BP: (!) 156/70   Pulse: 76   Resp: 16   Temp: 98.5 °F (36.9 °C)        Physical Exam   Constitutional: She appears well-developed. No distress.   HENT:   Head: Normocephalic and atraumatic.   Eyes: Conjunctivae are normal. Right eye exhibits no discharge. Left eye exhibits no discharge. No scleral icterus.   Cardiovascular: Normal rate, regular rhythm and normal heart sounds.  Exam reveals no gallop and no friction rub.    No murmur heard.  Pulmonary/Chest: Effort normal and breath sounds normal. No respiratory distress. She has no wheezes. She has no rales.   Musculoskeletal: She exhibits edema (trace bilat).   Neurological: She is alert.   antalgic gait, walks with rolling walker.    Skin: She is not diaphoretic.   Psychiatric: She has a normal mood and affect. Her behavior is normal.   Vitals reviewed.      Assessment:       1.  OAB (overactive bladder)    2. Anxiety    3. Generalized OA    4. Adjustment disorder, unspecified type    5. Memory loss    6. Slow transit constipation        Plan:       Ana was seen today for medication management.    Diagnoses and all orders for this visit:    OAB (overactive bladder)  -     Ambulatory referral to Urogynecology  No relief with oxybutinin at max dose or vesicare. Will refer to urogyn.     Anxiety  -     sertraline (ZOLOFT) 50 MG tablet; Take 1 tablet (50 mg total) by mouth once daily.  Pt has sxs suggestive of anxiety or adjustment disorder since she has moved from [place]. Some of her sxs preceded this move. These sxs are negatively impacting her life. Will give a trial of sertraline.     Generalized OA  Stop gabapentin. I am not sure why she was on this, but gabapentin will not help with OA so I am pleased to see it go.     Adjustment disorder, unspecified type  -     sertraline (ZOLOFT) 50 MG tablet; Take 1 tablet (50 mg total) by mouth once daily.  As above.     Constipation  - sxs and PEx suggest constipation. Pt encouraged to maintain adequate hydration with water, to maintain regular cardiovascular exercise, and to use Miralax or Colace prn constipation sxs. Pt advised that these treatments take a few days for effect and that the frequency of Miralax in particular can be titrated until the desired effect is achieved. Pt warned that if they develop N/V, bloody stools, severe abdominal pain, or other concerning sxs, pt asked to seek medical attention immediately.     Memory loss - pt and family declined MOCA or further intervention at this time.    At least 40 minutes were spent today with the patient in the office, which more than 50% of the time was spent on evaluation and counseling regarding The primary encounter diagnosis was OAB (overactive bladder). Diagnoses of Anxiety, Generalized OA, Adjustment disorder, unspecified type, Memory loss, and Slow transit constipation were  also pertinent to this visit..            Return in about 4 weeks (around 9/19/2017), or anxiety/adjustment.        Pt verbalized understanding and agreed with our plan.

## 2017-08-28 ENCOUNTER — TELEPHONE (OUTPATIENT)
Dept: UROGYNECOLOGY | Facility: CLINIC | Age: 82
End: 2017-08-28

## 2017-08-28 ENCOUNTER — TELEPHONE (OUTPATIENT)
Dept: FAMILY MEDICINE | Facility: CLINIC | Age: 82
End: 2017-08-28

## 2017-09-07 ENCOUNTER — INITIAL CONSULT (OUTPATIENT)
Dept: UROGYNECOLOGY | Facility: CLINIC | Age: 82
End: 2017-09-07
Payer: MEDICARE

## 2017-09-07 VITALS
HEIGHT: 63 IN | BODY MASS INDEX: 25.27 KG/M2 | DIASTOLIC BLOOD PRESSURE: 70 MMHG | SYSTOLIC BLOOD PRESSURE: 126 MMHG | WEIGHT: 142.63 LBS

## 2017-09-07 DIAGNOSIS — N81.11 MIDLINE CYSTOCELE: ICD-10-CM

## 2017-09-07 DIAGNOSIS — N81.2 CERVICAL PROLAPSE: ICD-10-CM

## 2017-09-07 DIAGNOSIS — R35.0 URINARY FREQUENCY: ICD-10-CM

## 2017-09-07 DIAGNOSIS — K59.09 CHRONIC CONSTIPATION: ICD-10-CM

## 2017-09-07 DIAGNOSIS — R39.15 URINARY URGENCY: Primary | ICD-10-CM

## 2017-09-07 DIAGNOSIS — N81.6 RECTOCELE, FEMALE: ICD-10-CM

## 2017-09-07 DIAGNOSIS — R35.1 NOCTURIA MORE THAN TWICE PER NIGHT: ICD-10-CM

## 2017-09-07 PROCEDURE — 99213 OFFICE O/P EST LOW 20 MIN: CPT | Mod: PBBFAC | Performed by: OBSTETRICS & GYNECOLOGY

## 2017-09-07 PROCEDURE — 99205 OFFICE O/P NEW HI 60 MIN: CPT | Mod: 25,S$PBB,, | Performed by: OBSTETRICS & GYNECOLOGY

## 2017-09-07 PROCEDURE — 57160 INSERT PESSARY/OTHER DEVICE: CPT | Mod: S$PBB,,, | Performed by: OBSTETRICS & GYNECOLOGY

## 2017-09-07 PROCEDURE — 57160 INSERT PESSARY/OTHER DEVICE: CPT | Mod: PBBFAC | Performed by: OBSTETRICS & GYNECOLOGY

## 2017-09-07 PROCEDURE — 1126F AMNT PAIN NOTED NONE PRSNT: CPT | Mod: ,,, | Performed by: OBSTETRICS & GYNECOLOGY

## 2017-09-07 PROCEDURE — 87086 URINE CULTURE/COLONY COUNT: CPT

## 2017-09-07 PROCEDURE — 1159F MED LIST DOCD IN RCRD: CPT | Mod: ,,, | Performed by: OBSTETRICS & GYNECOLOGY

## 2017-09-07 PROCEDURE — 99999 PR PBB SHADOW E&M-EST. PATIENT-LVL III: CPT | Mod: PBBFAC,,, | Performed by: OBSTETRICS & GYNECOLOGY

## 2017-09-07 PROCEDURE — 51701 INSERT BLADDER CATHETER: CPT | Mod: PBBFAC | Performed by: OBSTETRICS & GYNECOLOGY

## 2017-09-07 NOTE — PROGRESS NOTES
[facility]  [address] [facility] [address]    Ana Johnston  [id number]  9/21/1930 September 7, 2017    Consulting Physician: Tc Lemus., *   GYN: none  Primary M.D.: Tc Lemus MD    Chief Complaint   Patient presents with    Urinary Frequency     pt states she go to the restroom a lot     Nocturia     pt states she get up 4-3x a night touse the restroom     The patient's son was present for all major portions of the history taking and discussion after exam.     HPI:  85 y/o, pmhx notable for AFib (not on anticoagulant), IBS, HTN, anxiety, and hyper parathryroidism.    1)  UI:  (--) NICOLA <  (--) UUI X 2years. +increased urgency--worried may start to leak.   (--) pads:  Daytime frequency: Q < 1 hours. Tried VESIcare --helped years ago.  When restarted recently, did not help. Oxybutynin did not help.  Nocturia: Yes: 4/night.   (--) dysuria,  (--) hematuria,  (--) frequent UTIs.  Sometimes has some incomplete bladder emptying, ryan in the middle of the night.    2)  POP:  Feels bulge sometimes.   (--) vaginal bleeding. (--) vaginal discharge. (--) sexually active.  (--) dyspareunia.   (--)  Vaginal dryness.  (--) vaginal estrogen use.     3)  BM:  (+) constipation/straining (uses suppositories, irregular, on regular lomotil) will sometimes go 1 week without BM).  (+) chronic diarrhea (q daily). (--) hematochezia.  (--) fecal incontinence.  (--) fecal smearing/urgency.  (+) complete evacuation.      Past Medical History  Past Medical History:   Diagnosis Date    AF (paroxysmal atrial fibrillation) 6/22/2017    Anemia     Anxiety     Arthritis     Cataract     Disorder of kidney and ureter     Encounter for blood transfusion     Hypertension     OAB (overactive bladder) 8/22/2017    Osteoporosis     Polyuria     Thyroid disease         Past Surgical History  Past Surgical History:   Procedure Laterality Date    ADENOIDECTOMY      CATARACT EXTRACTION       HYSTERECTOMY N/A     KNEE SURGERY      TONSILLECTOMY        Hysterectomy: Yes  Date: .  Indication: Fibroids.    Type: xlap/JOSE  Cervix present: Yes   Ovaries present: Yes   Other procedures at time of hysterectomy:  Appendectomy    Past Ob History     x  Yes    Largest infant weight:  8#9  unknown FAVD. unknown episiotomy.      Gynecologic History  LMP: No LMP recorded. Patient has had a hysterectomy.  Age of menarche: 11  Age of menopause: Early 50s  Menstrual history: Regular  Pap test: 20 years ago.  History of abnormal paps: No.  History of STIs:  No  Mammogram: Date of last: 3/2016.  Result: Normal  Colonoscopy: Date of last: 2016.  Result:  Normal.  Repeat due:  N/A.    DEXA:  Date of last:  Unknown, reports history of osteoperosis (hx of colchisine usage)    Family History  Family History   Problem Relation Age of Onset    Arthritis Mother     Thyroid disease Mother     Heart attack Father     Diabetes Father     Liver disease Father     Arthritis Sister     Osteoporosis Sister     Hypertension Sister     Early death Brother     Asthma Son     Tuberculosis Maternal Grandmother     Early death Sister     Thyroid disease Other     Cervical cancer Neg Hx     Endometrial cancer Neg Hx     Vaginal cancer Neg Hx       Colon CA: No  Breast CA: No  GYN CA: No   CA: No    Social History  History   Smoking Status    Former Smoker   Smokeless Tobacco    Never Used     History   Alcohol Use    1.8 oz/week    3 Glasses of wine per week   .    History   Drug Use No   .  The patient is .  Resides in [place] [address].  Employment status: retired     Allergies  Review of patient's allergies indicates:   Allergen Reactions    Codeine     Darvocet a500 [propoxyphene n-acetaminophen]     Ibuprofen        Medications  Current Outpatient Prescriptions on File Prior to Visit   Medication Sig Dispense Refill    ACETAMINOPHEN (TYLENOL ORAL) Take by mouth 2  "(two) times daily as needed.       amlodipine (NORVASC) 10 MG tablet Take 10 mg by mouth once daily.      aspirin 325 MG tablet Take 1 tablet (325 mg total) by mouth once daily.  0    ATENOLOL ORAL Take 25 mg by mouth 2 (two) times daily.       diphenoxylate-atropine 2.5-0.025 mg (LOMOTIL) 2.5-0.025 mg per tablet Take 1 tablet by mouth 4 (four) times daily as needed for Diarrhea.      FOLIC ACID/MULTIVIT-MIN/LUTEIN (CENTRUM SILVER ORAL) Take by mouth once daily at 6am.      glycerin adult suppository Place 1 suppository rectally as needed for Constipation.      losartan-hydrochlorothiazide 50-12.5 mg (HYZAAR) 50-12.5 mg per tablet TAKE 1 TABLET BY MOUTH DAILY. DISCONTINUE LOSARTAN 30 tablet 2    sertraline (ZOLOFT) 50 MG tablet Take 1 tablet (50 mg total) by mouth once daily. 30 tablet 1     No current facility-administered medications on file prior to visit.        Review of Systems A 14 point ROS was reviewed with pertinent positives as noted above in the history of present illness.      Constitutional: negative  Eyes: negative  Endocrine: negative  Gastrointestinal: negative  Cardiovascular: negative  Respiratory: negative  Allergic/Immunologic: negative  Integumentary: negative  Psychiatric: negative  Musculoskeletal: negative   Ear/Nose/Throat: negative  Neurologic: negative  Genitourinary: SEE HPI  Hematologic/Lymphatic: negative   Breast: negative    Urogynecologic Exam  /70 (BP Location: Right arm, Patient Position: Sitting)   Ht 5' 3" (1.6 m)   Wt 64.7 kg (142 lb 10.2 oz)   BMI 25.27 kg/m²     GENERAL APPEARANCE:  The patient is well-developed, well-nourished.   Neck:  Supple with no thyromegaly, no carotid bruits.  Heart:  Regular rate and rhythm, no murmurs, rubs or gallops.  Lungs:  Clear.  No CVA tenderness.  Abdomen:  Soft, nontender, nondistended, no hepatosplenomegaly.  Incisions:  Vertical midline incision    PELVIC:    External genitalia:  Normal Bartholins, Skenes and labia " bilaterally.    Urethra:  No caruncle, diverticulum or masses.  (+) hypermobility.    Vagina:  Atrophy (+) , no bladder masses or tender, no discharge.    Cervix:  normal appearance  Uterus: uterus absent  Adnexa: Not palpable.    POP-Q:  Aa 0; Ba 0; C +2; Ap 0; Bp 0; D -5.  Genital hiatus 3, perineal body 2. total vaginal length 8-9.    NEUROLOGIC:  Cranial nerves 2 through 12 intact.  Strength 5/5.  DTRs 2+ lower extremities.  S2 through 4 normal.  Sacral reflexes intact.    EXT: MALLOY, 2+ pulses bilaterally, no C/C/E    COUGH STRESS TEST:  negative  KEGEL: 2 /5    RECTAL:    External:  Normal, (--) hemorrhoids, (--) dovetailing.   Internal: deferred    PVR: 20 mL    The patient was fit with #3 ring + support pessary.  She was able to tolerate the device comfortabley with bending, squatting, valsalva.  She was not able to demonstrate independent removal and placement.  She tolerated the procedure well.      Impression    1. Urinary urgency    2. Urinary frequency    3. Chronic constipation    4. Nocturia more than twice per night    5. Midline cystocele    6. Rectocele, female    7. Cervical prolapse        Initial Plan  The patient was counseled regarding these issues. The patient was given a summary sheet containing each of these issues with possible options for evaluation and management. When appropriate, we also reviewed computer-generated diagrams specific to their diagnoses..  All questions were addressed to the patient's satisfaction.     1)  Urinary urgency/frequency:    --urine C&S sent  --make sure you always have a nearby bathroom  --keep voiding diary x 3 days; then fax to [phone] (ATTN: VICKY) or scan/email to [email or web address]  --Empty bladder every 3 hours.  Empty well: wait a minute, lean forward on toilet.    --Avoid dietary irritants (see sheet).  Keep diary x 3-5 days to determine your irritants.  --For dry mouth: get sour, sugar free lozenge or gum.    --KEGELS: do 10 in AM and 10 in PM,  holding each x 10 seconds.  When you feel urge to go, STOP, KEGEL, and when urge has passed, then go to bathroom.  Consider PT in future.     --URGE: consider medication in future.  Takes 2-4 weeks to see if will have effect.  For dry mouth: get sour, sugar free lozenge or gum.  Hx of Vessicare, Oxybutynin (lack of results)    2) Constipation:  Controlling constipation may help bladder urgency/leakage and fiber may better control cholesterol and blood glucose.  Start daily fiber.  Take 1 tsp of fiber powder (psyllium or other sugar-free powder).  Mix in 8 oz of water.  Take x 3-5 days.  Then, increase fiber by 1 tsp every 3-5 days until stool is easy to pass.  Stop and continue at that dose.   Do not exceed 6 tsps/day.  May also use over the counter stool softener 1-2 x/day.  AVOID laxatives.    3)  Nocturia (nighttime urination): stop fluids 2 hours before bed/no water by bed.  If have leg swelling:  Elevate feet above chest x 1 hour before bed to get excess fluid off.  Can also use support hose (knee highs).      4)  Stage 3 cervical prolapse, stage 2 cystocele/rectocele:  --trial of pessary: patient fit with #3 ring + support.  Pessary order form completed, and will restock [facility] with incoming pessary.   --see if helps with urgency/frequency    5)   Vaginal atrophy (dryness):  CTM and consider using replens or rephresh at next visit.     6)  RTC 4-6 weeks.     Approximately 60 min were spent in consult, 75 % in discussion.     Thank you for requesting consultation of your patient.  I look forward to participating in their care.    Donna Guillen  Female Pelvic Medicine and Reconstructive Surgery  [facility] [place], [place]

## 2017-09-07 NOTE — LETTER
September 7, 2017      Tc Lemus MD  [address] [facility] [place] [address]           [facility] [address] [place] [address]  Phone: [phone]          Patient: Ana Johnston   MR Number: [id number]   YOB: 1930   Date of Visit: 9/7/2017       Dear Dr. Tc Lemus:    Thank you for referring Ana Johnston to me for evaluation. Attached you will find relevant portions of my assessment and plan of care.    If you have questions, please do not hesitate to call me. I look forward to following Ana Johnston along with you.    Sincerely,    Donna Guillen MD    Enclosure  CC:  No Recipients    If you would like to receive this communication electronically, please contact [email or web address] or [phone] to request more information on [employer] access.    For providers and/or their staff who would like to refer a patient to [facility], please contact us through our one-stop-shop provider referral line, [facility], at [phone].    If you feel you have received this communication in error or would no longer like to receive these types of communications, please e-mail [email or web address]

## 2017-09-07 NOTE — PATIENT INSTRUCTIONS
Bladder Irritants  Certain foods and drinks have been associated with worsening symptoms of urinary frequency, urgency, urge incontinence, or bladder pain. If you suffer from any of these conditions, you may wish to try eliminating one or more of these foods from your diet and see if your symptoms improve. If bladder symptoms are related to dietary factors, strict adherence to a diet thateliminates the food should bring marked relief in 10 days. Once you are feeling better, you can begin to add foods back into your diet, one at a time. If symptoms return, you will be able to identify the irritant. As you add foods back to your diet it is very important that you drink significant amounts of water.    -----------------------------------------------------------------------------------------------  List of Common Bladder Irritants*  Alcoholic beverages  Apples and apple juice  Cantaloupe  Carbonated beverages  Chili and spicy foods  Chocolate  Citrus fruit  Coffee (including decaffeinated)  Cranberries and cranberry juice  Grapes  Guava  Milk Products: milk, cheese, cottage cheese, yogurt, ice cream  Peaches  Pineapple  Plums  Strawberries  Sugar especially artificial sweeteners, saccharin, aspartame, corn sweeteners, honey, fructose, sucrose, lactose  Tea  Tomatoes and tomato juice  Vitamin B complex  Vinegar  *Most people are not sensitive to ALL of these products; your goal is to find the foods that make YOUR symptoms worse.  ---------------------------------------------------------------------------------------------------    Low-acid fruit substitutions include apricots, papaya, pears and watermelon. Coffee drinkers can drink Kava or other lowacid instant drinks. Tea drinkers can substitute non-citrus herbal and sun brewed teas. Calcium carbonate co-buffered with calcium ascorbate can be substituted for Vitamin C. Prelief is a dietary supplement that works as an acid blocker for the bladder.    Where to get more  information:        Overcoming Bladder Disorders by Elizabeth Hand and Chirag Javed, 1990        You Dont Have to Live with Cystitis! By Janae Burnett, 1988  · http://www.urologymanagement.org/oab    -------------------------------------------------------------    Fiber Information Sheet  Your doctor has recommended that you follow a high fiber diet. The addition of fiber to your diet can make an enormous difference in your bowel control and regularity. Fiber helps people whether they lose stool or have trouble with constipation. Fiber works by bulking the stool and keeping it formed, yet making the movement soft and easy to pass. Fiber helps keep moisture within the stool so that neither diarrhea nor hard stool occurs. Fiber makes the bowels work more regularly, but it is not a laxative. An additional bonus from eating a high fiber diet is that your risk of cancer is reduced, too.    Most of us eat some high fiber foods already, but nearly all of us do not eat the necessary amount. For example, a slice of whole wheat bread contains only about 10% of the daily recommended amount of fiber. This means if you are relying on only whole wheat bread to meet the recommended fiber requirements, you would need to eat  between 10-18 slices every day! Please note that fiber is NOT in any meat or dairy product. It is only found in grains, vegetables and fruits. The recommended daily fiber intake is 20-25 grams. Foods having high fiber content include:     Fiber One Cereal, ½ cup 13.0 g   Medina beans, ¾ cup 10.4 g   Wheat bran cereal, 1 oz 10.0 g   Kidney beans, ¾ cup 9.3 g   All Bran Cereal, ½ cup 6.0 g   Oat Bran Cereal, hot, 1 oz 4.0 g   Banana, 1medium 3.8 g   Canned pears, ½ cup 3.7 g   3 prunes or ¼ cup raisins 3.5 g   Whole Wheat Total, 1 cup 3.0 g   Carrots, ½ cup 3.2 g   Apple, small 2.8 g   Broccoli, ½ cup 2.8 g   Cauliflower, ½ cup 2.6 g   Oatmeal, 1 oz 2.5 g   Whole Wheat Toast  2.0 g   Cheerios, 1 1/3 cup 2.0 g   Baked potato with skin 2.0 g   Corn, ½ cup 1.9 g   Popcorn, 3 cups 1.9 g   Orange, medium 1.9 g   Granola bar 1.0 g   Lettuce, ½ cup 0.9 g    If you dont think that you can get enough fiber through your everyday diet, there are many good fiber supplements you can take along with eating your high fiber diet. Some of these are: Metamucil (1 heaping teaspoon or 1-2 wafers), Citrucel (1 tablespoon), Fiberall (1-2 wafers or 1 teaspoon), Perdium (2 rounded teaspoons) and 1-2 teaspoons unprocessed bran (to mix with foods)    You may need to use the fiber supplement up to 3-4 times daily to produce normal elimination. Please follow specific package directions or call us for help in regulating the dose. You may notice some bloating and/or increased gas at first. These symptoms can be relieved by adding fiber to your diet slowly. Once your body gets used to this increased fiber, these symptoms will go away.   --------------------------------------------      1)  Urinary urgency/frequency:    --urine C&S sent  --make sure you always have a nearby bathroom  --keep voiding diary x 3 days; then fax to [phone] (ATTN: VICKY) or scan/email to [email or web address]  --Empty bladder every 3 hours.  Empty well: wait a minute, lean forward on toilet.    --Avoid dietary irritants (see sheet).  Keep diary x 3-5 days to determine your irritants.  --For dry mouth: get sour, sugar free lozenge or gum.    --KEGELS: do 10 in AM and 10 in PM, holding each x 10 seconds.  When you feel urge to go, STOP, KEGEL, and when urge has passed, then go to bathroom.  Consider PT in future.     --URGE: consider medication in future.  Takes 2-4 weeks to see if will have effect.  For dry mouth: get sour, sugar free lozenge or gum.  Hx of Vessicare, Oxybutynin (lack of results)    2) Constipation:  Controlling constipation may help bladder urgency/leakage and fiber may better control cholesterol and blood glucose.   Start daily fiber.  Take 1 tsp of fiber powder (psyllium or other sugar-free powder).  Mix in 8 oz of water.  Take x 3-5 days.  Then, increase fiber by 1 tsp every 3-5 days until stool is easy to pass.  Stop and continue at that dose.   Do not exceed 6 tsps/day.  May also use over the counter stool softener 1-2 x/day.  AVOID laxatives.    3)  Nocturia (nighttime urination): stop fluids 2 hours before bed/no water by bed.  If have leg swelling:  Elevate feet above chest x 1 hour before bed to get excess fluid off.  Can also use support hose (knee highs).      4)  Stage 3 cervical prolapse, stage 2 cystocele/rectocele:  --trial of pessary  --see if helps with urgency/frequency    5)   Vaginal atrophy (dryness):  CTM and consider using replens or rephresh at next visit.     6)  RTC 4-6 weeks.

## 2017-09-08 LAB — BACTERIA UR CULT: NO GROWTH

## 2017-09-11 ENCOUNTER — TELEPHONE (OUTPATIENT)
Dept: UROGYNECOLOGY | Facility: CLINIC | Age: 82
End: 2017-09-11

## 2017-09-11 NOTE — TELEPHONE ENCOUNTER
----- Message from Donna Guillen MD sent at 9/10/2017  7:03 PM CDT -----  Please let patient's son (and patient) know that her urine C&S was negative for infection.  How is the pessary doing?  Please remind them to email me the voiding diary when she's done keeping it for 3 days.  Thanks!

## 2017-09-11 NOTE — TELEPHONE ENCOUNTER
Spoke to pt's son that stated Ms. Johnston is doing well and has gotten some relief from her pessary. Also relayed the negative UC results and reminded him to email the voiding diary. He voiced understanding and call ended.

## 2017-09-14 ENCOUNTER — PATIENT MESSAGE (OUTPATIENT)
Dept: UROGYNECOLOGY | Facility: CLINIC | Age: 82
End: 2017-09-14

## 2017-09-21 ENCOUNTER — OFFICE VISIT (OUTPATIENT)
Dept: FAMILY MEDICINE | Facility: CLINIC | Age: 82
End: 2017-09-21
Payer: MEDICARE

## 2017-09-21 VITALS
HEART RATE: 76 BPM | TEMPERATURE: 99 F | RESPIRATION RATE: 16 BRPM | OXYGEN SATURATION: 97 % | WEIGHT: 140.44 LBS | BODY MASS INDEX: 24.88 KG/M2 | DIASTOLIC BLOOD PRESSURE: 70 MMHG | HEIGHT: 63 IN | SYSTOLIC BLOOD PRESSURE: 120 MMHG

## 2017-09-21 DIAGNOSIS — I48.0 AF (PAROXYSMAL ATRIAL FIBRILLATION): Primary | ICD-10-CM

## 2017-09-21 DIAGNOSIS — F41.9 ANXIETY: ICD-10-CM

## 2017-09-21 DIAGNOSIS — K59.09 CHRONIC CONSTIPATION: ICD-10-CM

## 2017-09-21 DIAGNOSIS — B07.9 VIRAL WARTS, UNSPECIFIED TYPE: ICD-10-CM

## 2017-09-21 DIAGNOSIS — K58.0 IRRITABLE BOWEL SYNDROME WITH DIARRHEA: ICD-10-CM

## 2017-09-21 PROCEDURE — 99999 PR PBB SHADOW E&M-EST. PATIENT-LVL III: CPT | Mod: PBBFAC,,, | Performed by: FAMILY MEDICINE

## 2017-09-21 PROCEDURE — 1159F MED LIST DOCD IN RCRD: CPT | Mod: ,,, | Performed by: FAMILY MEDICINE

## 2017-09-21 PROCEDURE — 1126F AMNT PAIN NOTED NONE PRSNT: CPT | Mod: ,,, | Performed by: FAMILY MEDICINE

## 2017-09-21 PROCEDURE — 1157F ADVNC CARE PLAN IN RCRD: CPT | Mod: ,,, | Performed by: FAMILY MEDICINE

## 2017-09-21 PROCEDURE — 99213 OFFICE O/P EST LOW 20 MIN: CPT | Mod: PBBFAC,PO | Performed by: FAMILY MEDICINE

## 2017-09-21 PROCEDURE — 99215 OFFICE O/P EST HI 40 MIN: CPT | Mod: 25,S$PBB,, | Performed by: FAMILY MEDICINE

## 2017-09-21 RX ORDER — METOPROLOL SUCCINATE 25 MG/1
25 TABLET, EXTENDED RELEASE ORAL DAILY
Qty: 90 TABLET | Refills: 2 | Status: SHIPPED | OUTPATIENT
Start: 2017-09-21 | End: 2018-06-09 | Stop reason: SDUPTHER

## 2017-09-21 RX ORDER — ATENOLOL 25 MG/1
25 TABLET ORAL 2 TIMES DAILY
Qty: 60 TABLET | Refills: 0 | Status: CANCELLED | OUTPATIENT
Start: 2017-09-21 | End: 2018-09-21

## 2017-09-21 NOTE — PROCEDURES
"Cryotherapy  Date/Time: 9/21/2017 4:38 PM  Performed by: DC MCALLISTER  Authorized by: DC MCALLISTER     Consent Done?:  Yes (Verbal)  Time out: Immediately prior to procedure a "time out" was called to verify the correct patient, procedure, equipment, support staff and site/side marked as required.      Indications:  Wart    Location(s):    Upper Extremity:  Hand        Detail:  right long finger      "

## 2017-09-21 NOTE — PROGRESS NOTES
Subjective:       Patient ID: Ana Johnston is a 87 y.o. female.    Chief Complaint: Anxiety and Adjustment Disorder    HPI    Urinary urgency - Pt has not noted any improvement with kegel exercises or with pessary placement by Dr. Solo. She still has to get 4 times per night to urinate.     Anxiety - pt was started on sertraline after her last visit.     Constipation -     Pt does have occasional diarrhea which she takes lomotil but she has been taking it on a schedule, and has not had a bm in 3 - 4 days.     Pt has also stopped metamucil.     She does have dry mouth as side effect.     Htn - pt is on atenolol, which is soon to be discontinued, and is wondering if she needed a replacement.     Anxiety - overall, pt is happy with the effects of sertraline. She is much less anxious. Her family agrees. Everyone is happy with the current effects.       Pt c/o a small wart on her 3rd finger of her R hand. No pain, first noticed a few weeks ago, is not growing.       Current Outpatient Prescriptions on File Prior to Visit   Medication Sig Dispense Refill    ACETAMINOPHEN (TYLENOL ORAL) Take by mouth 2 (two) times daily as needed.       amlodipine (NORVASC) 10 MG tablet Take 10 mg by mouth once daily.      aspirin 325 MG tablet Take 1 tablet (325 mg total) by mouth once daily.  0    diphenoxylate-atropine 2.5-0.025 mg (LOMOTIL) 2.5-0.025 mg per tablet Take 1 tablet by mouth 4 (four) times daily as needed for Diarrhea.      FOLIC ACID/MULTIVIT-MIN/LUTEIN (CENTRUM SILVER ORAL) Take by mouth once daily at 6am.      glycerin adult suppository Place 1 suppository rectally as needed for Constipation.      losartan-hydrochlorothiazide 50-12.5 mg (HYZAAR) 50-12.5 mg per tablet TAKE 1 TABLET BY MOUTH DAILY. DISCONTINUE LOSARTAN 30 tablet 2    sertraline (ZOLOFT) 50 MG tablet Take 1 tablet (50 mg total) by mouth once daily. 30 tablet 1    [DISCONTINUED] ATENOLOL ORAL Take 25 mg by mouth 2 (two) times daily.        No  current facility-administered medications on file prior to visit.        Past Medical History:   Diagnosis Date    AF (paroxysmal atrial fibrillation) 6/22/2017    Anemia     Anxiety     Arthritis     Cataract     Disorder of kidney and ureter     Encounter for blood transfusion     Hypertension     OAB (overactive bladder) 8/22/2017    Osteoporosis     Polyuria     Thyroid disease        Family History   Problem Relation Age of Onset    Arthritis Mother     Thyroid disease Mother     Heart attack Father     Diabetes Father     Liver disease Father     Arthritis Sister     Osteoporosis Sister     Hypertension Sister     Early death Brother     Asthma Son     Tuberculosis Maternal Grandmother     Early death Sister     Thyroid disease Other     Cervical cancer Neg Hx     Endometrial cancer Neg Hx     Vaginal cancer Neg Hx         reports that she has quit smoking. She has never used smokeless tobacco. She reports that she drinks about 1.8 oz of alcohol per week . She reports that she does not use drugs.    Review of Systems   Constitutional: Negative for activity change and unexpected weight change.   HENT: Negative for hearing loss, rhinorrhea and trouble swallowing.    Eyes: Negative for discharge and visual disturbance.   Respiratory: Negative for chest tightness and wheezing.    Cardiovascular: Negative for chest pain and palpitations.   Gastrointestinal: Positive for constipation and diarrhea. Negative for blood in stool and vomiting.   Endocrine: Positive for polyuria. Negative for polydipsia.   Genitourinary: Negative for difficulty urinating, dysuria, hematuria and menstrual problem.   Musculoskeletal: Negative for arthralgias, joint swelling and neck pain.   Neurological: Positive for weakness. Negative for headaches.   Psychiatric/Behavioral: Positive for confusion and dysphoric mood.       Objective:     Vitals:    09/21/17 1458   BP: 120/70   Pulse: 76   Resp: 16   Temp: 98.5  °F (36.9 °C)        Physical Exam   Constitutional: She appears well-developed. No distress.   HENT:   Head: Normocephalic and atraumatic.   Eyes: Conjunctivae are normal. No scleral icterus.   Pulmonary/Chest: Effort normal.   Neurological: She is alert.   Skin: She is not diaphoretic.   0.5 cm verrucous appearing lesion over R medial aspect of dorsal 3rd digit.    Psychiatric: She has a normal mood and affect. Her behavior is normal.   Vitals reviewed.      Assessment:       1. AF (paroxysmal atrial fibrillation)    2. Irritable bowel syndrome with diarrhea    3. Chronic constipation    4. Viral warts, unspecified type    5. Anxiety        Plan:       Ana was seen today for anxiety and adjustment disorder.    Diagnoses and all orders for this visit:    AF (paroxysmal atrial fibrillation)  -     metoprolol succinate (TOPROL-XL) 25 MG 24 hr tablet; Take 1 tablet (25 mg total) by mouth once daily.  Will change atenolol to metoprolol as atenolol is being discontinued by the .     Irritable bowel syndrome with diarrhea  Hold lomotil until bowel for use ONLY when she has loose stool.     Chronic constipation  Constipation likely secondary to the fact that she is still taking lomotil BID despite not having diarrhea, and she also stopped her metamucil.     Viral warts, unspecified type  Pt wished to have frozen today.     Anxiety- pt is doing very well on sertraline without side effects. She and her family are happy with its effects and current dosage.           Return in about 3 months (around 12/21/2017).        Pt verbalized understanding and agreed with our plan.     At least 40 minutes were spent today with the patient in the office, which more than 50% of the time was spent on evaluation and counseling regarding The primary encounter diagnosis was AF (paroxysmal atrial fibrillation). Diagnoses of Irritable bowel syndrome with diarrhea, Chronic constipation, Viral warts, unspecified type, and Anxiety  were also pertinent to this visit..

## 2017-09-28 ENCOUNTER — PATIENT MESSAGE (OUTPATIENT)
Dept: FAMILY MEDICINE | Facility: CLINIC | Age: 82
End: 2017-09-28

## 2017-10-04 ENCOUNTER — TELEPHONE (OUTPATIENT)
Dept: FAMILY MEDICINE | Facility: CLINIC | Age: 82
End: 2017-10-04

## 2017-10-04 NOTE — TELEPHONE ENCOUNTER
Tc Lemus MD   You 15 hours ago (5:06 PM)      Sorry, yes it has been discontinued  (Routing comment)                Please advise if vesicare has been discontinued.          ----- Message from Loreta Dyer sent at 10/4/2017  9:29 AM CDT -----  [facility] is calling for a 90 day supply solifenacin (VESICARE) 10 MG tablet . [facility] can be reached at [phone]. Thank you!

## 2017-10-08 DIAGNOSIS — F41.9 ANXIETY: ICD-10-CM

## 2017-10-08 DIAGNOSIS — F43.20 ADJUSTMENT DISORDER, UNSPECIFIED TYPE: ICD-10-CM

## 2017-10-09 RX ORDER — SERTRALINE HYDROCHLORIDE 50 MG/1
50 TABLET, FILM COATED ORAL DAILY
Qty: 30 TABLET | Refills: 1 | Status: SHIPPED | OUTPATIENT
Start: 2017-10-09 | End: 2017-11-28

## 2017-10-25 ENCOUNTER — TELEPHONE (OUTPATIENT)
Dept: UROGYNECOLOGY | Facility: CLINIC | Age: 82
End: 2017-10-25

## 2017-10-25 NOTE — TELEPHONE ENCOUNTER
Left voice message for pt to give the office a call back at [phone] to r/s missed appointment on 10/25 @ 3:30pm

## 2017-10-27 ENCOUNTER — TELEPHONE (OUTPATIENT)
Dept: UROGYNECOLOGY | Facility: CLINIC | Age: 82
End: 2017-10-27

## 2017-10-27 NOTE — TELEPHONE ENCOUNTER
----- Message from Alyssa Yang sent at 10/27/2017 11:04 AM CDT -----  Contact: Antolin Johnston (Pt Son)  x_ 1st Request  _ 2nd Request  _ 3rd Request    Who: Antolin Johnston (Pt Son)    Why: is needing to reschedule patient's appointment    What Number to Call Back: [phone] Antolin Johnston (Pt Son)    When to Expect a call back: (Before the end of the day)  -- if call after 3:00 call back will be tomorrow.

## 2017-10-27 NOTE — TELEPHONE ENCOUNTER
Returned pt's son Antolin call no answer, left an voice message for Antolin to call the office back.

## 2017-10-27 NOTE — TELEPHONE ENCOUNTER
----- Message from Alyssa Yang sent at 10/27/2017  1:04 PM CDT -----  Contact: Antolin Johnston (Pt Son)  x_ 1st Request  _ 2nd Request  _ 3rd Request    Who: Antolin Johnston (Pt Son)    Why: returning a call    What Number to Call Back: [phone] please call before 2pm or after 3pm    When to Expect a call back: (Before the end of the day)  -- if call after 3:00 call back will be tomorrow.

## 2017-10-30 ENCOUNTER — OFFICE VISIT (OUTPATIENT)
Dept: UROGYNECOLOGY | Facility: CLINIC | Age: 82
End: 2017-10-30
Payer: MEDICARE

## 2017-10-30 VITALS
HEIGHT: 63 IN | BODY MASS INDEX: 24.84 KG/M2 | WEIGHT: 140.19 LBS | SYSTOLIC BLOOD PRESSURE: 120 MMHG | DIASTOLIC BLOOD PRESSURE: 70 MMHG

## 2017-10-30 DIAGNOSIS — N32.81 OAB (OVERACTIVE BLADDER): ICD-10-CM

## 2017-10-30 DIAGNOSIS — K59.09 CHRONIC CONSTIPATION: ICD-10-CM

## 2017-10-30 DIAGNOSIS — N81.6 RECTOCELE, FEMALE: ICD-10-CM

## 2017-10-30 DIAGNOSIS — N95.2 ATROPHIC VAGINITIS: Primary | ICD-10-CM

## 2017-10-30 DIAGNOSIS — N81.11 MIDLINE CYSTOCELE: ICD-10-CM

## 2017-10-30 DIAGNOSIS — R39.15 URINARY URGENCY: ICD-10-CM

## 2017-10-30 DIAGNOSIS — R35.1 NOCTURIA: ICD-10-CM

## 2017-10-30 DIAGNOSIS — K58.0 IRRITABLE BOWEL SYNDROME WITH DIARRHEA: ICD-10-CM

## 2017-10-30 DIAGNOSIS — N81.2 CERVICAL PROLAPSE: ICD-10-CM

## 2017-10-30 DIAGNOSIS — R35.0 INCREASED FREQUENCY OF URINATION: ICD-10-CM

## 2017-10-30 DIAGNOSIS — N39.46 URINARY INCONTINENCE, MIXED: ICD-10-CM

## 2017-10-30 PROCEDURE — 99213 OFFICE O/P EST LOW 20 MIN: CPT | Mod: PBBFAC | Performed by: OBSTETRICS & GYNECOLOGY

## 2017-10-30 PROCEDURE — 99214 OFFICE O/P EST MOD 30 MIN: CPT | Mod: S$PBB,,, | Performed by: OBSTETRICS & GYNECOLOGY

## 2017-10-30 PROCEDURE — 99999 PR PBB SHADOW E&M-EST. PATIENT-LVL III: CPT | Mod: PBBFAC,,, | Performed by: OBSTETRICS & GYNECOLOGY

## 2017-10-30 RX ORDER — ESTRADIOL 0.1 MG/G
0.5 CREAM VAGINAL
Qty: 42.5 G | Refills: 11 | Status: SHIPPED | OUTPATIENT
Start: 2017-10-30 | End: 2019-07-30 | Stop reason: SDUPTHER

## 2017-10-30 NOTE — PROGRESS NOTES
Urogyn follow up    [facility]  [address] [id number]    Ana Johnston  [id number]  9/21/1930      Ana Johnston is a 87 y.o. here for a urogyn follow up.    The patient's son was present for all major portions of the history taking and discussion after exam.     HPI:  87 y/o, pmhx notable for AFib (not on anticoagulant), IBS, HTN, anxiety, and hyper parathryroidism.    1)  UI:  (--) NICOLA <  (--) UUI X 2years. +increased urgency--worried may start to leak.   (--) pads:  Daytime frequency: Q < 1 hours. Tried VESIcare --helped years ago.  When restarted recently, did not help. Oxybutynin did not help.  Nocturia: Yes: 4/night.   (--) dysuria,  (--) hematuria,  (--) frequent UTIs.  Sometimes has some incomplete bladder emptying, ryan in the middle of the night.    2)  POP:  Feels bulge sometimes.   (--) vaginal bleeding. (--) vaginal discharge. (--) sexually active.  (--) dyspareunia.   (--)  Vaginal dryness.  (--) vaginal estrogen use.   --POP-Q:  Aa 0; Ba 0; C +2; Ap 0; Bp 0; D -5.  Genital hiatus 3, perineal body 2. total vaginal length 8-9.  --The patient was fit with #3 ring + support pessary.  She was able to tolerate the device comfortabley with bending, squatting, valsalva.  She was not able to demonstrate independent removal and placement.  She tolerated the procedure well.    --PVR 20 mL    3)  BM:  (+) constipation/straining (uses suppositories, irregular, on regular lomotil) will sometimes go 1 week without BM).  (+) chronic diarrhea (q daily). (--) hematochezia.  (--) fecal incontinence.  (--) fecal smearing/urgency.  (+) complete evacuation.      Past Medical History  Past Medical History:   Diagnosis Date    AF (paroxysmal atrial fibrillation) 6/22/2017    Anemia     Anxiety     Arthritis     Cataract     Disorder of kidney and ureter     Encounter for blood transfusion     Hypertension     OAB (overactive bladder) 8/22/2017    Osteoporosis     Polyuria      Thyroid disease         Past Surgical History  Past Surgical History:   Procedure Laterality Date    ADENOIDECTOMY      CATARACT EXTRACTION      HYSTERECTOMY N/A     KNEE SURGERY      TONSILLECTOMY        Hysterectomy: Yes  Date: .  Indication: Fibroids.    Type: xlap/JOSE  Cervix present: Yes   Ovaries present: Yes   Other procedures at time of hysterectomy:  Appendectomy    Past Ob History     x  Yes    Largest infant weight:  8#9  unknown FAVD. unknown episiotomy.      Gynecologic History  LMP: No LMP recorded. Patient has had a hysterectomy.  Age of menarche: 11  Age of menopause: Early 50s  Menstrual history: Regular  Pap test: 20 years ago.  History of abnormal paps: No.  History of STIs:  No  Mammogram: Date of last: 3/2016.  Result: Normal  Colonoscopy: Date of last: 2016.  Result:  Normal.  Repeat due:  N/A.    DEXA:  Date of last:  Unknown, reports history of osteoperosis (hx of colchisine usage)    Issues include:  Patient Active Problem List   Diagnosis    Chronic midline low back pain without sciatica    Hypertension, well controlled    Irritable bowel syndrome with diarrhea    AF (paroxysmal atrial fibrillation)    Primary hyperparathyroidism    Hypercalcemia    Essential hypertension    Generalized OA    Anxiety    OAB (overactive bladder)    Urinary urgency    Increased frequency of urination    Chronic constipation    Cervical prolapse    Rectocele, female    Midline cystocele    Nocturia    Atrophic vaginitis    Urinary incontinence, mixed       History since last visit:     1)  Urinary urgency/frequency:    --urine C&S  NEG  --Daytime frequency: Q < 1 hours   --is only drinking 3 glasses water/day  --making sure you always have a nearby bathroom  --kept voiding diary x 3 days--not sure where copy is; patient states U/F not improved but son states was better on VT    2) Constipation:  --taking metamucil (1 T)  --has good and bad days    3)  Nocturia  "(nighttime urination):   --baseline: 4/night.   --was 2x/PM x a few nights  --stops fluids 2 hours before bed/no water by bed  --does not have leg and swelling     4)  Stage 3 cervical prolapse, stage 2 cystocele/rectocele:  --wearing #3 ring + support  --feels is helping bulge  --no issues: no VB, pain, discharge    Medications:    Current Outpatient Prescriptions:     ACETAMINOPHEN (TYLENOL ORAL), Take by mouth 2 (two) times daily as needed. , Disp: , Rfl:     amlodipine (NORVASC) 10 MG tablet, Take 10 mg by mouth once daily., Disp: , Rfl:     aspirin 325 MG tablet, Take 1 tablet (325 mg total) by mouth once daily., Disp: , Rfl: 0    diphenoxylate-atropine 2.5-0.025 mg (LOMOTIL) 2.5-0.025 mg per tablet, Take 1 tablet by mouth 4 (four) times daily as needed for Diarrhea., Disp: , Rfl:     FOLIC ACID/MULTIVIT-MIN/LUTEIN (CENTRUM SILVER ORAL), Take by mouth once daily at 6am., Disp: , Rfl:     glycerin adult suppository, Place 1 suppository rectally as needed for Constipation., Disp: , Rfl:     losartan-hydrochlorothiazide 50-12.5 mg (HYZAAR) 50-12.5 mg per tablet, TAKE 1 TABLET BY MOUTH DAILY. DISCONTINUE LOSARTAN, Disp: 30 tablet, Rfl: 2    metoprolol succinate (TOPROL-XL) 25 MG 24 hr tablet, Take 1 tablet (25 mg total) by mouth once daily., Disp: 90 tablet, Rfl: 2    sertraline (ZOLOFT) 50 MG tablet, TAKE 1 TABLET (50 MG TOTAL) BY MOUTH ONCE DAILY., Disp: 30 tablet, Rfl: 1    conjugated estrogens (PREMARIN) vaginal cream, Place 0.5 g vaginally twice a week., Disp: 30 g, Rfl: 12    ROS:  As per HPI.      Exam  /70 (BP Location: Left arm, Patient Position: Sitting)   Ht 5' 3" (1.6 m)   Wt 63.6 kg (140 lb 3.4 oz)   BMI 24.84 kg/m²   General: alert and oriented, no acute distress  Abd: soft, non-tender, non-distended    Pelvic  Ext. Genitalia: normal external genitalia. Normal bartholin's and skenes glands  Vagina: #3 ring + support pessary in place.  + atrophy. Normal vaginal mucosa without " lesions. No discharge noted.   Non-tender bladder base without palpable mass.  Cervix: no lesions  Uterus:  uterus is absent   Urethra: no masses or tenderness  Urethral meatus: no lesions, caruncle or prolapse.  #3 ring + support pessary cleaned and replaced without issue.     Impression  1. Atrophic vaginitis  conjugated estrogens (PREMARIN) vaginal cream   2. Urinary incontinence, mixed  Ambulatory consult to Physical Therapy   3. Nocturia  Ambulatory consult to Physical Therapy   4. Increased frequency of urination  Ambulatory consult to Physical Therapy   5. Urinary urgency     6. OAB (overactive bladder)     7. Midline cystocele     8. Cervical prolapse     9. Rectocele, female     10. Irritable bowel syndrome with diarrhea     11. Chronic constipation       We reviewed the above issues and discussed options for short-term versus long-term management of her problems.   Plan:   1)  Urinary urgency/frequency:    --increase hydration to at least 6 x 8 oz of water a day   --urine C&S sent  --make sure you always have a nearby bathroom  --Empty bladder every 3 hours.  Empty well: wait a minute, lean forward on toilet.    --Avoid dietary irritants (see sheet).  Keep diary x 3-5 days to determine your irritants.  --For dry mouth: get sour, sugar free lozenge or gum.    --start pelvic floor PT:  Rajni Flores/Lauren Shepley  ([facility]/[place]).  (p) [phone]  . (f) [phone].  Call in a few days to make appt.    --URGE: consider medication in future.  Takes 2-4 weeks to see if will have effect.  For dry mouth: get sour, sugar free lozenge or gum.  Hx of Vesicare, Oxybutynin (lack of results)    2) Constipation:  --continue daily fiber. Work on increasing fiber dose gradually to find a dose that best controls bowel movement.  Take 1 T of fiber powder (psyllium or other sugar-free powder).  Mix in 8 oz of water.  Take x 3-5 days.  Then, increase fiber by 1 T every 3-5 days until stool is easy to pass.   Stop and continue at that dose.   Do not exceed 6 tsps/day.  May also use over the counter stool softener 1-2 x/day.  AVOID laxatives.  --controlling bowel movements may help with bladder issues    3)  Nocturia (nighttime urination): stop fluids 2 hours before bed/no water by bed.  If have leg swelling:  Elevate feet above chest x 1 hour before bed to get excess fluid off.  Can also use support hose (knee highs).      4)  Stage 3 cervical prolapse, stage 2 cystocele/rectocele:  --trial of pessary: patient fit with #3 ring + support.  Pessary order form completed, and will restock [facility] with incoming pessary.   --RTC every 3 months for checks    5)   Vaginal atrophy (dryness):  Use dime-sized amount of estrogen cream in vagina and around opening twice a week.        6)   RTC 3 months.     40 minutes were spent in face to face time with this patient  90 % of this time was spent in counseling and/or coordination of care  ME@  [facility]  Division of Female Pelvic Medicine and Reconstructive Surgery  Department of Obstetrics & Gynecology

## 2017-10-30 NOTE — PATIENT INSTRUCTIONS
1)  Urinary urgency/frequency:    --increase hydration to at least 6 x 8 oz of water a day   --urine C&S sent  --make sure you always have a nearby bathroom  --Empty bladder every 3 hours.  Empty well: wait a minute, lean forward on toilet.    --Avoid dietary irritants (see sheet).  Keep diary x 3-5 days to determine your irritants.  --For dry mouth: get sour, sugar free lozenge or gum.    --start pelvic floor PT:  Rajni Flores/Lauren Shepley  ([facility]/[place]).  (p) [phone]  . (f) [phone].  Call in a few days to make appt.    --URGE: consider medication in future.  Takes 2-4 weeks to see if will have effect.  For dry mouth: get sour, sugar free lozenge or gum.  Hx of Vesicare, Oxybutynin (lack of results)    2) Constipation:  --continue daily fiber. Work on increasing fiber dose gradually to find a dose that best controls bowel movement.  Take 1 T of fiber powder (psyllium or other sugar-free powder).  Mix in 8 oz of water.  Take x 3-5 days.  Then, increase fiber by 1 T every 3-5 days until stool is easy to pass.  Stop and continue at that dose.   Do not exceed 6 tsps/day.  May also use over the counter stool softener 1-2 x/day.  AVOID laxatives.  --controlling bowel movements may help with bladder issues    3)  Nocturia (nighttime urination): stop fluids 2 hours before bed/no water by bed.  If have leg swelling:  Elevate feet above chest x 1 hour before bed to get excess fluid off.  Can also use support hose (knee highs).      4)  Stage 3 cervical prolapse, stage 2 cystocele/rectocele:  --trial of pessary: patient fit with #3 ring + support.  Pessary order form completed, and will restock [facility] with incoming pessary.   --RTC every 3 months for checks    5)   Vaginal atrophy (dryness):  Use dime-sized amount of estrogen cream in vagina and around opening twice a week.        6)   RTC 3 months.

## 2017-11-06 ENCOUNTER — PATIENT MESSAGE (OUTPATIENT)
Dept: FAMILY MEDICINE | Facility: CLINIC | Age: 82
End: 2017-11-06

## 2017-11-08 ENCOUNTER — PATIENT MESSAGE (OUTPATIENT)
Dept: FAMILY MEDICINE | Facility: CLINIC | Age: 82
End: 2017-11-08

## 2017-11-20 ENCOUNTER — CLINICAL SUPPORT (OUTPATIENT)
Dept: REHABILITATION | Facility: HOSPITAL | Age: 82
End: 2017-11-20
Attending: OBSTETRICS & GYNECOLOGY
Payer: MEDICARE

## 2017-11-20 DIAGNOSIS — R35.0 INCREASED FREQUENCY OF URINATION: ICD-10-CM

## 2017-11-20 DIAGNOSIS — N81.89 PELVIC FLOOR WEAKNESS: ICD-10-CM

## 2017-11-20 DIAGNOSIS — N39.46 URINARY INCONTINENCE, MIXED: ICD-10-CM

## 2017-11-20 DIAGNOSIS — R39.15 URINARY URGENCY: Primary | ICD-10-CM

## 2017-11-20 PROCEDURE — G8991 OTHER PT/OT GOAL STATUS: HCPCS | Mod: CK,PN

## 2017-11-20 PROCEDURE — 97161 PT EVAL LOW COMPLEX 20 MIN: CPT | Mod: PN

## 2017-11-20 PROCEDURE — G8990 OTHER PT/OT CURRENT STATUS: HCPCS | Mod: CK,PN

## 2017-11-20 NOTE — PROGRESS NOTES
Patient: nAa Johnston   [facility] #:  [id number]    Date of treatment: 11/20/2017   # Visits: 1  Auth: 20  Referral expiration: 12/31/17  POC expiration: 2/12/17    Outpatient Physical Therapy   Initial Evaluation    Ana is a 87 y.o. female evaluated on 11/20/2017    Physician:  Donna Guillen MD   Diagnosis:   Encounter Diagnoses   Name Primary?    Urinary urgency Yes    Increased frequency of urination     Urinary incontinence, mixed         Treatment ordered: PT    Medical History:   Past Medical History:   Diagnosis Date    AF (paroxysmal atrial fibrillation) 6/22/2017    Anemia     Anxiety     Arthritis     Cataract     Disorder of kidney and ureter     Encounter for blood transfusion     Hypertension     OAB (overactive bladder) 8/22/2017    Osteoporosis     Polyuria     Thyroid disease         Surgical History:   Past Surgical History:   Procedure Laterality Date    ADENOIDECTOMY      CATARACT EXTRACTION      HYSTERECTOMY N/A 1965    KNEE SURGERY      TONSILLECTOMY          Medications:   Current Outpatient Prescriptions   Medication Sig    ACETAMINOPHEN (TYLENOL ORAL) Take by mouth 2 (two) times daily as needed.     amlodipine (NORVASC) 10 MG tablet Take 10 mg by mouth once daily.    aspirin 325 MG tablet Take 1 tablet (325 mg total) by mouth once daily.    diphenoxylate-atropine 2.5-0.025 mg (LOMOTIL) 2.5-0.025 mg per tablet Take 1 tablet by mouth 4 (four) times daily as needed for Diarrhea.    estradiol (ESTRACE) 0.01 % (0.1 mg/gram) vaginal cream Place 0.5 g vaginally twice a week.    FOLIC ACID/MULTIVIT-MIN/LUTEIN (CENTRUM SILVER ORAL) Take by mouth once daily at 6am.    glycerin adult suppository Place 1 suppository rectally as needed for Constipation.    losartan-hydrochlorothiazide 50-12.5 mg (HYZAAR) 50-12.5 mg per tablet TAKE 1 TABLET BY MOUTH DAILY. DISCONTINUE LOSARTAN    metoprolol succinate (TOPROL-XL) 25 MG 24 hr tablet Take 1 tablet (25 mg total) by mouth once daily.     sertraline (ZOLOFT) 50 MG tablet TAKE 1 TABLET (50 MG TOTAL) BY MOUTH ONCE DAILY.     No current facility-administered medications for this visit.        Allergies:   Review of patient's allergies indicates:   Allergen Reactions    Codeine     Darvocet a500 [propoxyphene n-acetaminophen]     Ibuprofen       Precautions: Fall, osteoporosis  OB/GYN History: 3-4 miscarriages, 1 vaginal delivery; oopherectomy; always had problems with menstruating, abdominal pain  Bladder/Bowel History: denies       Barriers to Learning: none  Educational/Spiritual/Cultural needs: none  Environmental Barriers: none noted  Abuse/Neglect: no signs  Nutritional Status: well developed well nourished  Fall Risk: none    Subjective     Pt reports that she has to urinate very often and even when she finishes, feels like she has to go more. Pt states she doesn't think she is leaking. Pt states that a urologist recommended she wear incontinence pads but she thinks that may have made her butt sore so she stopped. Started wearing them again about two weeks ago. She reports that she had a fall in the dining room going to dinner (lives in assisted living), hurt a little where she fell on her bum. Pt states what she is concerned about how much her son does for her, he convinced her to move here from [place] to be closer, has been here since the end of May. Pt states that in terms of her bladder health, her main concern is that when she gets up at night, she is worried about waking up her . Pt states her main concern is that after she finishes urinating and puts her clothes back on, she feels like she has to go again right away.    Pt states she has arthritis in her hands, will hurt at night.  Cholecystectomy  Tonsillitis    Pain: Patient reports mild pain. Pt states if she has to go to the bathroom and can't get to one, she has bladder pain    Pain or lack of sensation with vaginal/pelvic exam? denies  Sexually active?  "No    Frequency of Urination: Daytime: "at least every 15 minutes" (though pt did not require use of bathroom at today's visit)        Nighttime: 3-4    Urine Stream: strong    Bladder Leakage: unsure    Do you have difficulty initiating your urine stream? no  Do you feel like you are emptying completely? "I try"    Frequency of Bowel Movements: sometimes every day, sometimes goes two days without one; takes metamucil 2 times/day every day, needs it due to IBS    Do you have difficulty initiating a bowel movement? sometimes  Colon Leakage: No    Form of Protection: pad  Number of Pads required in 24 hours: a few    Types/amount of Fluid Intake: water, 3 glasses  Current Exercise: pt states she does [employer]    Occupation: Pt was a .  Living situation? Assisted living with her .     Patient's Goals: to not go as often    Objective     PFM assessment deferred today due to time constraint    TREATMENT    Pt received 20 minutes of therapeutic exercise including patient education: Instructed on anatomy/physiology of urinary/bowel system and PF function using pelvic model; discussed plan of care with patient; instructed in purpose of physical therapy and the  benefits/risks of treatment; instructed in risks of refusing treatment. Patient agreed to treatment plan.     Assessment     Ana is a 87 y.o. female referred to outpatient physical therapy with a medical diagnosis of urinary incontinence, nocturia, and frequency. Pt presents today unsure of whether or not she is leaking urine, stating that her main concern is the feeling that she needs to go to the bathroom immediately after urinating. Pt expected to have PFM weakness and decreased control. Pt may be limited due to her age. Pt will benefit from physcial therapy services in order to maximize pain free functional independence. The following goals were discussed with the patient and patient is in agreement with them as to be addressed in the " treatment plan.     Prognosis: fair  No educational, cultural, or spiritual needs identified.    History  Co-morbidities and personal factors that may impact the plan of care Examination  Body Structures and Functions, activity limitations and participation restrictions that may impact the plan of care    Clinical Presentation   Co-morbidities:   Cholecystectomy  Arthritis, osteoporosis        Personal Factors:   age Body Regions:   Pelvis, trunk    Body Systems:    Musculoskeletal, neuromuscular            Participation Restrictions:   none     Activity limitations:   Learning and applying knowledge  no deficits    General Tasks and Commands  no deficits    Communication  no deficits    Mobility  walking    Self care  no deficits    Domestic Life  no deficits    Interactions/Relationships  no deficits    Life Areas  no deficits    Community and Social Life  no deficits         stable and uncomplicated                      low   low  low Decision Making/ Complexity Score:  low       GOALS  Short Term Goals: 6 weeks (1/1/18)  1. Pt will verbalize improved awareness of PFM activity as palpated by PT in order to improve activity involvement with HEP.  2. Pt will be able to perform a PFM contraction in order to improve pelvic strength and stability and improve control.  3. Pt will improve hip strength in order to improve pelvic stability.  4. Pt will tolerate HEP to improve impairments and independence with ADL's.    Long Term Goals: 12 weeks (2/12/17)  1. Pt will be able to perform 3-5 second kegal x 5 reps in order to improve management of urinary frequency.  2. Pt will complete a bladder diary in order to address behavioral components of urinary frequency.  3. Pt will be independent with HEP and self management.    CMS Impairment/Limitation/Restriction for Urinary Problem Survey  Status Limitation G-Code CMS Severity Modifier  Intake 57% 43% Current Status CK - At least 40 percent but less than 60 percent  Predicted  68% 32% Goal Status+ CJ - At least 20 percent but less than 40 percent    CMS Impairment/Limitation/Restriction for Bowel Cnst Survey  Status Limitation G-Code CMS Severity Modifier  Intake 60% 40% Current Status CK - At least 40 percent but less than 60 percent  Predicted 67% 33% Goal Status+ CJ - At least 20 percent but less than 40 percent    Plan     Pt will be treated by physical therapy 1 time a week for 3 months for Pt Education, HEP, therapeutic exercises, neuromuscular re-education, therapeutic activity, gait training, manual therapy, and modalities (including SEMG) PRN to achieve established goals.

## 2017-11-21 NOTE — PLAN OF CARE
Patient: Ana Johnston   [facility] #:  [id number]    Date of treatment: 11/20/2017   # Visits: 1  Auth: 20  Referral expiration: 12/31/17  POC expiration: 2/12/17    Outpatient Physical Therapy   Initial Evaluation    Ana is a 87 y.o. female evaluated on 11/20/2017    Physician:  Donna Guillen MD   Diagnosis:   Encounter Diagnoses   Name Primary?    Urinary urgency Yes    Increased frequency of urination     Urinary incontinence, mixed         Treatment ordered: PT    Medical History:   Past Medical History:   Diagnosis Date    AF (paroxysmal atrial fibrillation) 6/22/2017    Anemia     Anxiety     Arthritis     Cataract     Disorder of kidney and ureter     Encounter for blood transfusion     Hypertension     OAB (overactive bladder) 8/22/2017    Osteoporosis     Polyuria     Thyroid disease         Surgical History:   Past Surgical History:   Procedure Laterality Date    ADENOIDECTOMY      CATARACT EXTRACTION      HYSTERECTOMY N/A 1965    KNEE SURGERY      TONSILLECTOMY          Medications:   Current Outpatient Prescriptions   Medication Sig    ACETAMINOPHEN (TYLENOL ORAL) Take by mouth 2 (two) times daily as needed.     amlodipine (NORVASC) 10 MG tablet Take 10 mg by mouth once daily.    aspirin 325 MG tablet Take 1 tablet (325 mg total) by mouth once daily.    diphenoxylate-atropine 2.5-0.025 mg (LOMOTIL) 2.5-0.025 mg per tablet Take 1 tablet by mouth 4 (four) times daily as needed for Diarrhea.    estradiol (ESTRACE) 0.01 % (0.1 mg/gram) vaginal cream Place 0.5 g vaginally twice a week.    FOLIC ACID/MULTIVIT-MIN/LUTEIN (CENTRUM SILVER ORAL) Take by mouth once daily at 6am.    glycerin adult suppository Place 1 suppository rectally as needed for Constipation.    losartan-hydrochlorothiazide 50-12.5 mg (HYZAAR) 50-12.5 mg per tablet TAKE 1 TABLET BY MOUTH DAILY. DISCONTINUE LOSARTAN    metoprolol succinate (TOPROL-XL) 25 MG 24 hr tablet Take 1 tablet (25 mg total) by mouth once daily.     sertraline (ZOLOFT) 50 MG tablet TAKE 1 TABLET (50 MG TOTAL) BY MOUTH ONCE DAILY.     No current facility-administered medications for this visit.        Allergies:   Review of patient's allergies indicates:   Allergen Reactions    Codeine     Darvocet a500 [propoxyphene n-acetaminophen]     Ibuprofen       Precautions: Fall, osteoporosis  OB/GYN History: 3-4 miscarriages, 1 vaginal delivery; oopherectomy; always had problems with menstruating, abdominal pain  Bladder/Bowel History: denies       Barriers to Learning: none  Educational/Spiritual/Cultural needs: none  Environmental Barriers: none noted  Abuse/Neglect: no signs  Nutritional Status: well developed well nourished  Fall Risk: none    Subjective     Pt reports that she has to urinate very often and even when she finishes, feels like she has to go more. Pt states she doesn't think she is leaking. Pt states that a urologist recommended she wear incontinence pads but she thinks that may have made her butt sore so she stopped. Started wearing them again about two weeks ago. She reports that she had a fall in the dining room going to dinner (lives in assisted living), hurt a little where she fell on her bum. Pt states what she is concerned about how much her son does for her, he convinced her to move here from [place] to be closer, has been here since the end of May. Pt states that in terms of her bladder health, her main concern is that when she gets up at night, she is worried about waking up her . Pt states her main concern is that after she finishes urinating and puts her clothes back on, she feels like she has to go again right away.    Pt states she has arthritis in her hands, will hurt at night.  Cholecystectomy  Tonsillitis    Pain: Patient reports mild pain. Pt states if she has to go to the bathroom and can't get to one, she has bladder pain    Pain or lack of sensation with vaginal/pelvic exam? denies  Sexually active?  "No    Frequency of Urination: Daytime: "at least every 15 minutes" (though pt did not require use of bathroom at today's visit)        Nighttime: 3-4    Urine Stream: strong    Bladder Leakage: unsure    Do you have difficulty initiating your urine stream? no  Do you feel like you are emptying completely? "I try"    Frequency of Bowel Movements: sometimes every day, sometimes goes two days without one; takes metamucil 2 times/day every day, needs it due to IBS    Do you have difficulty initiating a bowel movement? sometimes  Colon Leakage: No    Form of Protection: pad  Number of Pads required in 24 hours: a few    Types/amount of Fluid Intake: water, 3 glasses  Current Exercise: pt states she does [employer]    Occupation: Pt was a .  Living situation? Assisted living with her .     Patient's Goals: to not go as often    Objective     PFM assessment deferred today due to time constraint    TREATMENT    Pt received 20 minutes of therapeutic exercise including patient education: Instructed on anatomy/physiology of urinary/bowel system and PF function using pelvic model; discussed plan of care with patient; instructed in purpose of physical therapy and the  benefits/risks of treatment; instructed in risks of refusing treatment. Patient agreed to treatment plan.     Assessment     Ana is a 87 y.o. female referred to outpatient physical therapy with a medical diagnosis of urinary incontinence, nocturia, and frequency. Pt presents today unsure of whether or not she is leaking urine, stating that her main concern is the feeling that she needs to go to the bathroom immediately after urinating. Pt expected to have PFM weakness and decreased control. Pt may be limited due to her age. Pt will benefit from physcial therapy services in order to maximize pain free functional independence. The following goals were discussed with the patient and patient is in agreement with them as to be addressed in the " treatment plan.     Prognosis: fair  No educational, cultural, or spiritual needs identified.    History  Co-morbidities and personal factors that may impact the plan of care Examination  Body Structures and Functions, activity limitations and participation restrictions that may impact the plan of care    Clinical Presentation   Co-morbidities:   Cholecystectomy  Arthritis, osteoporosis        Personal Factors:   age Body Regions:   Pelvis, trunk    Body Systems:    Musculoskeletal, neuromuscular            Participation Restrictions:   none     Activity limitations:   Learning and applying knowledge  no deficits    General Tasks and Commands  no deficits    Communication  no deficits    Mobility  walking    Self care  no deficits    Domestic Life  no deficits    Interactions/Relationships  no deficits    Life Areas  no deficits    Community and Social Life  no deficits         stable and uncomplicated                      low   low  low Decision Making/ Complexity Score:  low       GOALS  Short Term Goals: 6 weeks (1/1/18)  1. Pt will verbalize improved awareness of PFM activity as palpated by PT in order to improve activity involvement with HEP.  2. Pt will be able to perform a PFM contraction in order to improve pelvic strength and stability and improve control.  3. Pt will improve hip strength in order to improve pelvic stability.  4. Pt will tolerate HEP to improve impairments and independence with ADL's.    Long Term Goals: 12 weeks (2/12/17)  1. Pt will be able to perform 3-5 second kegal x 5 reps in order to improve management of urinary frequency.  2. Pt will complete a bladder diary in order to address behavioral components of urinary frequency.  3. Pt will be independent with HEP and self management.    CMS Impairment/Limitation/Restriction for Urinary Problem Survey  Status Limitation G-Code CMS Severity Modifier  Intake 57% 43% Current Status CK - At least 40 percent but less than 60 percent  Predicted  68% 32% Goal Status+ CJ - At least 20 percent but less than 40 percent    CMS Impairment/Limitation/Restriction for Bowel Cnst Survey  Status Limitation G-Code CMS Severity Modifier  Intake 60% 40% Current Status CK - At least 40 percent but less than 60 percent  Predicted 67% 33% Goal Status+ CJ - At least 20 percent but less than 40 percent    Plan     Pt will be treated by physical therapy 1 time a week for 3 months for Pt Education, HEP, therapeutic exercises, neuromuscular re-education, therapeutic activity, gait training, manual therapy, and modalities (including SEMG) PRN to achieve established goals.

## 2017-11-26 ENCOUNTER — PATIENT MESSAGE (OUTPATIENT)
Dept: FAMILY MEDICINE | Facility: CLINIC | Age: 82
End: 2017-11-26

## 2017-11-28 RX ORDER — SERTRALINE HYDROCHLORIDE 100 MG/1
100 TABLET, FILM COATED ORAL DAILY
Qty: 90 TABLET | Refills: 2 | Status: SHIPPED | OUTPATIENT
Start: 2017-11-28 | End: 2018-06-22 | Stop reason: SDUPTHER

## 2017-11-29 RX ORDER — LOSARTAN POTASSIUM AND HYDROCHLOROTHIAZIDE 12.5; 5 MG/1; MG/1
TABLET ORAL
Qty: 30 TABLET | Refills: 2 | Status: SHIPPED | OUTPATIENT
Start: 2017-11-29 | End: 2018-02-26 | Stop reason: SDUPTHER

## 2017-12-12 DIAGNOSIS — F43.20 ADJUSTMENT DISORDER, UNSPECIFIED TYPE: ICD-10-CM

## 2017-12-12 DIAGNOSIS — F41.9 ANXIETY: ICD-10-CM

## 2017-12-12 RX ORDER — SERTRALINE HYDROCHLORIDE 50 MG/1
TABLET, FILM COATED ORAL
Qty: 30 TABLET | Refills: 2 | OUTPATIENT
Start: 2017-12-12

## 2017-12-13 RX ORDER — SERTRALINE HYDROCHLORIDE 100 MG/1
100 TABLET, FILM COATED ORAL DAILY
Qty: 90 TABLET | Refills: 2 | Status: CANCELLED | OUTPATIENT
Start: 2017-12-13

## 2017-12-21 ENCOUNTER — CLINICAL SUPPORT (OUTPATIENT)
Dept: REHABILITATION | Facility: HOSPITAL | Age: 82
End: 2017-12-21
Attending: OBSTETRICS & GYNECOLOGY
Payer: MEDICARE

## 2017-12-21 ENCOUNTER — OFFICE VISIT (OUTPATIENT)
Dept: FAMILY MEDICINE | Facility: CLINIC | Age: 82
End: 2017-12-21
Payer: MEDICARE

## 2017-12-21 VITALS
HEIGHT: 63 IN | BODY MASS INDEX: 23.55 KG/M2 | SYSTOLIC BLOOD PRESSURE: 116 MMHG | WEIGHT: 132.94 LBS | TEMPERATURE: 99 F | DIASTOLIC BLOOD PRESSURE: 76 MMHG | OXYGEN SATURATION: 95 % | HEART RATE: 81 BPM | RESPIRATION RATE: 16 BRPM

## 2017-12-21 DIAGNOSIS — Z23 FLU VACCINE NEED: ICD-10-CM

## 2017-12-21 DIAGNOSIS — N39.46 URINARY INCONTINENCE, MIXED: ICD-10-CM

## 2017-12-21 DIAGNOSIS — N81.89 PELVIC FLOOR WEAKNESS: Primary | ICD-10-CM

## 2017-12-21 DIAGNOSIS — K62.5 BLOOD PER RECTUM: ICD-10-CM

## 2017-12-21 DIAGNOSIS — F41.9 ANXIETY: Primary | ICD-10-CM

## 2017-12-21 DIAGNOSIS — K59.01 SLOW TRANSIT CONSTIPATION: ICD-10-CM

## 2017-12-21 DIAGNOSIS — R35.0 INCREASED FREQUENCY OF URINATION: ICD-10-CM

## 2017-12-21 DIAGNOSIS — R39.15 URINARY URGENCY: ICD-10-CM

## 2017-12-21 PROCEDURE — G8991 OTHER PT/OT GOAL STATUS: HCPCS | Mod: CJ,PN

## 2017-12-21 PROCEDURE — G8990 OTHER PT/OT CURRENT STATUS: HCPCS | Mod: CK,PN

## 2017-12-21 PROCEDURE — 99999 PR PBB SHADOW E&M-EST. PATIENT-LVL III: CPT | Mod: PBBFAC,,, | Performed by: FAMILY MEDICINE

## 2017-12-21 PROCEDURE — 99213 OFFICE O/P EST LOW 20 MIN: CPT | Mod: PBBFAC,PO,25 | Performed by: FAMILY MEDICINE

## 2017-12-21 PROCEDURE — G0008 ADMIN INFLUENZA VIRUS VAC: HCPCS | Mod: PBBFAC,PO

## 2017-12-21 PROCEDURE — 97110 THERAPEUTIC EXERCISES: CPT | Mod: PN

## 2017-12-21 PROCEDURE — 99214 OFFICE O/P EST MOD 30 MIN: CPT | Mod: S$PBB,,, | Performed by: FAMILY MEDICINE

## 2017-12-21 NOTE — PROGRESS NOTES
Subjective:       Patient ID: Ana Johnston is a 87 y.o. female.    Chief Complaint: AF (paroxysmal atrial fibrillation) (3 months follow up ); Hypertension; and Anxiety    HPI   Part of her hx provided by her son    Anxiety - pt and family agree that the increased dose of sertraline has been working well for her without side effect.     Constipation - pt has been using metamucil with success. She does occasionally get a few spots of blood rarely if her stool is hard.     BRBPR - this morning she had an episode of flatulence this morning a/w the passage of blood. This has never occurred to her before.  No melena.      Current Outpatient Prescriptions on File Prior to Visit   Medication Sig Dispense Refill    ACETAMINOPHEN (TYLENOL ORAL) Take by mouth 2 (two) times daily as needed.       aspirin 325 MG tablet Take 1 tablet (325 mg total) by mouth once daily.  0    diphenoxylate-atropine 2.5-0.025 mg (LOMOTIL) 2.5-0.025 mg per tablet Take 1 tablet by mouth 4 (four) times daily as needed for Diarrhea.      estradiol (ESTRACE) 0.01 % (0.1 mg/gram) vaginal cream Place 0.5 g vaginally twice a week. 42.5 g 11    FOLIC ACID/MULTIVIT-MIN/LUTEIN (CENTRUM SILVER ORAL) Take by mouth once daily at 6am.      glycerin adult suppository Place 1 suppository rectally as needed for Constipation.      losartan-hydrochlorothiazide 50-12.5 mg (HYZAAR) 50-12.5 mg per tablet TAKE 1 TABLET BY MOUTH DAILY. DISCONTINUE LOSARTAN 30 tablet 2    metoprolol succinate (TOPROL-XL) 25 MG 24 hr tablet Take 1 tablet (25 mg total) by mouth once daily. 90 tablet 2    sertraline (ZOLOFT) 100 MG tablet Take 1 tablet (100 mg total) by mouth once daily. 90 tablet 2    [DISCONTINUED] amlodipine (NORVASC) 10 MG tablet Take 10 mg by mouth once daily.       No current facility-administered medications on file prior to visit.        Past Medical History:   Diagnosis Date    AF (paroxysmal atrial fibrillation) 6/22/2017    Anemia     Anxiety      Arthritis     Cataract     Disorder of kidney and ureter     Encounter for blood transfusion     Hypertension     OAB (overactive bladder) 8/22/2017    Osteoporosis     Polyuria     Thyroid disease        Family History   Problem Relation Age of Onset    Arthritis Mother     Thyroid disease Mother     Heart attack Father     Diabetes Father     Liver disease Father     Arthritis Sister     Osteoporosis Sister     Hypertension Sister     Early death Brother     Asthma Son     Tuberculosis Maternal Grandmother     Early death Sister     Thyroid disease Other     Cervical cancer Neg Hx     Endometrial cancer Neg Hx     Vaginal cancer Neg Hx         reports that she has quit smoking. She has never used smokeless tobacco. She reports that she drinks about 1.8 oz of alcohol per week . She reports that she does not use drugs.    Review of Systems   Constitutional: Negative for activity change and unexpected weight change.   HENT: Negative for hearing loss, rhinorrhea and trouble swallowing.    Eyes: Negative for discharge and visual disturbance.   Respiratory: Negative for chest tightness and wheezing.    Cardiovascular: Negative for chest pain and palpitations.   Gastrointestinal: Positive for diarrhea. Negative for blood in stool, constipation and vomiting.   Endocrine: Negative for polydipsia and polyuria.   Genitourinary: Negative for difficulty urinating, dysuria, hematuria and menstrual problem.   Musculoskeletal: Positive for arthralgias. Negative for joint swelling and neck pain.   Neurological: Positive for weakness. Negative for headaches.   Psychiatric/Behavioral: Positive for confusion. Negative for dysphoric mood.       Objective:     Vitals:    12/21/17 1307   BP: 116/76   Pulse: 81   Resp: 16   Temp: 98.5 °F (36.9 °C)        Physical Exam   Constitutional: She appears well-developed. No distress.   HENT:   Head: Normocephalic and atraumatic.   Eyes: Conjunctivae are normal. Right eye  exhibits no discharge. Left eye exhibits no discharge. No scleral icterus.   Cardiovascular: Normal rate, regular rhythm and normal heart sounds.  Exam reveals no gallop and no friction rub.    No murmur heard.  Pulmonary/Chest: Effort normal and breath sounds normal. No respiratory distress. She has no wheezes. She has no rales.   Musculoskeletal:   Mild thoracic kyphosis   Neurological: She is alert.   Skin: She is not diaphoretic.   Psychiatric: She has a normal mood and affect.   Vitals reviewed.      Assessment:       1. Anxiety    2. Blood per rectum    3. Flu vaccine need    4. Slow transit constipation        Plan:       Ana was seen today for af (paroxysmal atrial fibrillation), hypertension and anxiety.    Diagnoses and all orders for this visit:    Anxiety  - Chronic - stable     Pt is doing well on current therapy and is requesting a refill. No side effects noted. Will continue current therapy.    Blood per rectum  After weighing risks of missing potential pathology vs risks of a potential colonoscopy, pt and son agreed that they would watch for a recurrence of rectal bleeding and set up an appointment with [facility] (already established) if this occurs.     Flu vaccine need  -     Influenza - High Dose (65+) (PF) (IM)    Slow transit constipation  Pt will start drinking coffee and metamucil once per day to maintain soft stools.           Return in about 3 months (around 3/21/2018), or anxiety. weight stability.        Pt verbalized understanding and agreed with our plan.

## 2017-12-21 NOTE — PROGRESS NOTES
Patient: nAa Johnston   [facility] #: [id number]   Diagnosis:   Encounter Diagnoses   Name Primary?    Pelvic floor weakness Yes    Urinary incontinence, mixed     Increased frequency of urination     Urinary urgency       Date of start of care: 11/20/17  Date of treatment: 12/21/2017   Time in: 3:00  Time out: 4:00  Total treatment time: 60 minutes  # Visits: 2  Auth expiration: 12/31/17  POC expiration: 2/12/18  Outpatient Physical Therapy   Daily Note    Subjective     Pt states things are about the same. Pt reports that she feels like after putting her cream in in the morning she just wipes it away because she uses the bathroom a lot. Pt reports nocturia x 3.    Pain: Current: NA    Objective     ORTHO SCREEN  Posture: Thoracic kyphosis   Pelvic Alignment: no deviations noted in supine    ABDOMINALS  Scarring: none     Diastasis: none   Abdominal strength: Rectus: 2/5     Transverse: weak       VAGINAL PELVIC FLOOR EXAM  EXTERNAL ASSESSMENT  Skin Condition: hemorrhoids noted  Scarring: none  Sensation: WNL  Pain: none  Introitus: gaping   Voluntary Contraction: visible lift  Voluntary Relaxation: drop  Bearing down: present      INTERNAL ASSESSMENT  Pain: none  Sensation: able to localize pressure appropriately, able to distinguish pressure from side to side, and able to feel the difference between lift and drop    Vaginal Vault: roomy  Muscle Bulk: atrophy  Muscle Power: 2/5  Muscle Endurance: 5 sec  # Reps To Fatigue: NA    Fast Contractions: NA    Quality of Contraction: slow rise and decreased hold  Specificity: patient contracts: gluts   Coordination: WNL  Prolapse Check: anterior and posterior vaginal wall weakness    TREATMENT    Pt received individual therapeutic exercise to develop strength, coordination, endurance, motor control and core stability for 60 minutes with verbal cueing provided throughout today's visit including:    - 10 x 5'' kegals in supine with verbal cueing  - 10 quick flicks  - Deep  breathing     Education: Discussed progression of plan of care with patient; educated pt in activity modification; pt instructed in HEP including 5'' kegals and diaphragmatic breathing; pt demonstrated and verbalized understanding and was provided with a handout. Administered bladder diary with instructions.    Assessment     Pt tolerated session well without visible adverse effects. PFM assessment reveals decreaed PFM strength and endurance, fair recruitment of PFM. Kegals modified per PFM assessment today (pt was performing kegal > 5'' with decreased rest period between contractions). Pt displayed good carry over during today's visit though she requires verbal cueing throughout, will follow up on PFM exercises at her next visit. Pt was assisted in the bathroom today; pt reported that she felt she was urinating, though no urine stream was heard and no urine noted in the toilet, pt likely to have age related difficulty with reporting actual urinary habits; pt's chief complaint is urinary frequency, she states she goes up to every 15 minutes. This report is inconsistent with her therapy visits and with son's report - per pt's son, she is able to go a few hours without needing to use the restroom. Pt will continue to benefit form skilled PT intervention in order to improve PFM strength and function. Pt may be limited in making appropriate behavioral changes due to her age. Initiate hip strengthening at pt's next visit.    Pt is making good progress towards established goals.  No educational, cultural, or spiritual barriers to learning identified.    GOALS  Short Term Goals: 6 weeks (1/1/18)  1. Pt will verbalize improved awareness of PFM activity as palpated by PT in order to improve activity involvement with HEP.  2. Pt will be able to perform a PFM contraction in order to improve pelvic strength and stability and improve control.  3. Pt will improve hip strength in order to improve pelvic stability.  4. Pt will  tolerate HEP to improve impairments and independence with ADL's.     Long Term Goals: 12 weeks (2/12/17)  1. Pt will be able to perform 3-5 second kegal x 5 reps in order to improve management of urinary frequency.  2. Pt will complete a bladder diary in order to address behavioral components of urinary frequency.  3. Pt will be independent with HEP and self management.     CMS Impairment/Limitation/Restriction for Urinary Problem Survey  Status Limitation G-Code CMS Severity Modifier  Intake 57% 43% Current Status CK - At least 40 percent but less than 60 percent  Predicted 68% 32% Goal Status+ CJ - At least 20 percent but less than 40 percent     CMS Impairment/Limitation/Restriction for Bowel Cnst Survey  Status Limitation G-Code CMS Severity Modifier  Intake 60% 40% Current Status CK - At least 40 percent but less than 60 percent  Predicted 67% 33% Goal Status+ CJ - At least 20 percent but less than 40 percent    Plan     Continue with established POC, working toward PT goals.

## 2017-12-28 ENCOUNTER — CLINICAL SUPPORT (OUTPATIENT)
Dept: REHABILITATION | Facility: HOSPITAL | Age: 82
End: 2017-12-28
Attending: OBSTETRICS & GYNECOLOGY
Payer: MEDICARE

## 2017-12-28 DIAGNOSIS — N81.89 PELVIC FLOOR WEAKNESS: ICD-10-CM

## 2017-12-28 PROCEDURE — 97110 THERAPEUTIC EXERCISES: CPT | Mod: PN

## 2017-12-28 NOTE — PROGRESS NOTES
Patient: Ana Johnston   [facility] #: [id number]   Diagnosis:   Encounter Diagnosis   Name Primary?    Pelvic floor weakness       Date of start of care: 11/20/17  Date of treatment: 12/28/2017   Time in: 10:20  Time out: 11:00  Total treatment time: 40 minutes (pt arrived late)  # Visits: 3  Auth expiration: 12/31/17  POC expiration: 2/12/18  Outpatient Physical Therapy   Daily Note    Subjective     Pt reports that she has continued to do her exercises, she usually feels like she has to urinate afterwards. Pt reports that she often feels damp but is not (this occurs in other parts of her body as well). Pt states her bottom is sore.    Pain: Current: NA    Objective     TREATMENT    Pt received individual therapeutic exercise to develop strength, coordination, endurance, motor control and core stability for 40 minutes including:    - 10 x 5'' kegals in supine with verbal cueing  - Deep breathing  - Urinary void; pt is modified independent with voiding (requires hand rail); pt states she had a small dribble though no urine appeared in toilet or on paper, pt also noted to wipe multiple times despite being dry  - Pt education regarding increasing fluid intake and bathroom hygiene (pt instructed to wipe less and to use baby wipes and cream for her bottom, will relay information to pt's son)    Education: Discussed progression of plan of care with patient; educated pt in activity modification; pt instructed to continue with 5'' kegals and to increase water intake. Pt verbalized understanding.    Assessment     Pt tolerated session well without visible adverse effects. Pt does require verbal cueing to perform her HEP. Pt also likely to be feeling moisture in the absence of moisture and feeling urgency due to not having ample water intake. Pt was instructed to drink add two additional glasses of water (one between breakfast and lunch, one between lunch and dinner) in addition to the water she drinks with her meals. Will relay  this information to pt's son as well with information noted above. Pt continues to present with complaint of urinary frequency though this has not been confirmed and presents with PFM, hip, LE, and core weakness. Pt will continue to benefit form skilled PT intervention in order to improve PFM strength and function. Pt may be limited in making appropriate behavioral changes due to her age. Initiate hip strengthening at pt's next visit.    Pt is making good progress towards established goals.  No educational, cultural, or spiritual barriers to learning identified.    GOALS  Short Term Goals: 6 weeks (1/1/18)  1. Pt will verbalize improved awareness of PFM activity as palpated by PT in order to improve activity involvement with HEP.  2. Pt will be able to perform a PFM contraction in order to improve pelvic strength and stability and improve control. Met 12/28/17  3. Pt will improve hip strength in order to improve pelvic stability.  4. Pt will tolerate HEP to improve impairments and independence with ADL's. Partially met     Long Term Goals: 12 weeks (2/12/17)  1. Pt will be able to perform 3-5 second kegal x 5 reps in order to improve management of urinary frequency.  2. Pt will complete a bladder diary in order to address behavioral components of urinary frequency.  3. Pt will be independent with HEP and self management.     CMS Impairment/Limitation/Restriction for Urinary Problem Survey  Status Limitation G-Code CMS Severity Modifier  Intake 57% 43% Current Status CK - At least 40 percent but less than 60 percent  Predicted 68% 32% Goal Status+ CJ - At least 20 percent but less than 40 percent     CMS Impairment/Limitation/Restriction for Bowel Cnst Survey  Status Limitation G-Code CMS Severity Modifier  Intake 60% 40% Current Status CK - At least 40 percent but less than 60 percent  Predicted 67% 33% Goal Status+ CJ - At least 20 percent but less than 40 percent    Plan     Continue with established POC, working  toward PT goals.

## 2018-01-12 ENCOUNTER — CLINICAL SUPPORT (OUTPATIENT)
Dept: REHABILITATION | Facility: HOSPITAL | Age: 83
End: 2018-01-12
Attending: OBSTETRICS & GYNECOLOGY
Payer: MEDICARE

## 2018-01-12 DIAGNOSIS — N81.89 PELVIC FLOOR WEAKNESS: ICD-10-CM

## 2018-01-12 PROCEDURE — G8990 OTHER PT/OT CURRENT STATUS: HCPCS | Mod: CK,PN

## 2018-01-12 PROCEDURE — G8991 OTHER PT/OT GOAL STATUS: HCPCS | Mod: CJ,PN

## 2018-01-12 PROCEDURE — 97110 THERAPEUTIC EXERCISES: CPT | Mod: PN

## 2018-01-12 NOTE — PROGRESS NOTES
"Patient: Ana Johnston   [facility] #: [id number]   Diagnosis:   Encounter Diagnosis   Name Primary?    Pelvic floor weakness       Date of start of care: 11/20/17  Date of treatment: 01/12/2018   Time in: 10:15  Time out: 11:06  Total treatment time: 51 minutes   # Visits: 4  Auth expiration: 12/31/17  POC expiration: 2/12/18  Outpatient Physical Therapy   Daily Note    Subjective     Pt reports that she feels ok about her exercises. She has been trying to drink more water, still feel like she urinates a lot, always goes before leaving the house. Pt reports she has been using baby wipes. Pt is satisfied overall with her PT tx but states that her urinary frequency has not improved.    Pain: Current: NA    Objective     TREATMENT    Pt received individual therapeutic exercise to develop strength, coordination, endurance, motor control and core stability for 45 minutes including:    - Pt education including discussion of fluid intake changes, HEP, current medical condition, POC; continued to discuss importance of using the bathroom when she definitely feels like she has to urinate instead of "just in case"  - Supine clams YTB 20 x 3''  - Supine ball squeeze 20 x 3''    Not performed:  - 10 x 5'' kegals in supine with verbal cueing  - Deep breathing  - Urinary void; pt is modified independent with voiding (requires hand rail); pt states she had a small dribble though no urine appeared in toilet or on paper, pt also noted to wipe multiple times despite being dry  - Pt education regarding increasing fluid intake and bathroom hygiene (pt instructed to wipe less and to use baby wipes and cream for her bottom, will relay information to pt's son)    Education: Discussed progression of plan of care with patient; educated pt in activity modification; pt instructed to continue with 5'' kegals and to increase water intake. Pt verbalized understanding.    Assessment     Pt tolerated session well without visible adverse effects. Pt " continues to report chief c/o urinary frequency. Will follow up on PFM exercises at her next visit and progress hip/core strengthening. Assessment of urinary voids difficulty due to poor record keeping in pt's bladder diary and inconsistent report from pt versus her son. Pt also possibly feeling moisture when she is dry due to c/o of moisture and excessive wiping noted in the clinic.Pt continues to present with PFM, hip, LE, and core weakness. Pt will continue to benefit form skilled PT intervention in order to improve PFM strength and function.     Pt is making fair progress towards established goals.  No educational, cultural, or spiritual barriers to learning identified.    GOALS  Short Term Goals: 6 weeks (1/1/18)  1. Pt will verbalize improved awareness of PFM activity as palpated by PT in order to improve activity involvement with HEP.  2. Pt will be able to perform a PFM contraction in order to improve pelvic strength and stability and improve control. Met 12/28/17  3. Pt will improve hip strength in order to improve pelvic stability.  4. Pt will tolerate HEP to improve impairments and independence with ADL's. Partially met     Long Term Goals: 12 weeks (2/12/17)  1. Pt will be able to perform 3-5 second kegal x 5 reps in order to improve management of urinary frequency.  2. Pt will complete a bladder diary in order to address behavioral components of urinary frequency.  3. Pt will be independent with HEP and self management.     CMS Impairment/Limitation/Restriction for Urinary Problem Survey  Status Limitation G-Code CMS Severity Modifier  Intake 57% 43%  Predicted 68% 32% Goal Status+ CJ - At least 20 percent but less than 40 percent  1/12/2018 61% 39% Current Status CJ - At least 20 percent but less than 40 percent     CMS Impairment/Limitation/Restriction for Bowel Cnst Survey  Status Limitation G-Code CMS Severity Modifier  Intake 60% 40%  Predicted 67% 33% Goal Status+ CJ - At least 20 percent but less  than 40 percent  1/12/2018 47% 53% Current Status CK - At least 40 percent but less than 60 percent    Plan     Continue with established POC, working toward PT goals.

## 2018-01-22 ENCOUNTER — CLINICAL SUPPORT (OUTPATIENT)
Dept: REHABILITATION | Facility: HOSPITAL | Age: 83
End: 2018-01-22
Attending: OBSTETRICS & GYNECOLOGY
Payer: MEDICARE

## 2018-01-22 DIAGNOSIS — N81.89 PELVIC FLOOR WEAKNESS: ICD-10-CM

## 2018-01-22 PROCEDURE — 97110 THERAPEUTIC EXERCISES: CPT | Mod: PN

## 2018-01-22 PROCEDURE — G8992 OTHER PT/OT  D/C STATUS: HCPCS | Mod: CK,PN

## 2018-01-22 PROCEDURE — G8991 OTHER PT/OT GOAL STATUS: HCPCS | Mod: CJ,PN

## 2018-01-22 NOTE — PROGRESS NOTES
"Patient: Ana Johnston   [facility] #: [id number]   Diagnosis:   Encounter Diagnosis   Name Primary?    Pelvic floor weakness       Date of start of care: 11/20/17  Date of treatment: 01/22/2018   Time in: 2:10  Time out: 3:00  Total treatment time: 50 minutes   # Visits: 5  Auth expiration: 12/31/17  POC expiration: 2/12/18  Outpatient Physical Therapy   Discharge    Subjective     Pt with questions today regarding her HEP - she is holding her kegal for 5 seconds but unsure how long to relax for.    Pain: Current: NA    Objective     TREATMENT    Pt received individual therapeutic exercise to develop strength, coordination, endurance, motor control and core stability for 45 minutes including:    - 10 x 5'' kegals in supine with SEMG assist, external perianal electrodes; provided verbal cueing throughout with instructions to rest for 10 seconds; pt with improved carry over with reps today though poor carry over from session to session, clarified in writing on pt's handout to improve carry over with HEP  - Pt education regarding self management and continuation of HEP    Not performed:  - Supine clams YTB 20 x 3''  - Supine ball squeeze 20 x 3''  - Deep breathing    Education: Discussed management; educated pt in activity modification; pt instructed to continue with 5'' kegals, clams, and ball squeeze. Pt verbalized understanding.    Assessment     Pt tolerated session well without visible adverse effects. Pt continues to report chief c/o urinary frequency that has remained unchanged since starting PT per pt report. Pt also complains of feeling "wet" not only in her underwear but also at night; she states she asks her  and he tells her that she feels cold but not wet. Pt 's pad is consistently dry at her PT sessions. Pt with poor carry over of her HEP but has good compliance and motivation. Pt is not currently making progress in therapy, will therefore discharge to [facility].    GOALS  Short Term Goals: 6 weeks " (1/1/18)  1. Pt will verbalize improved awareness of PFM activity as palpated by PT in order to improve activity involvement with HEP. Partially met  2. Pt will be able to perform a PFM contraction in order to improve pelvic strength and stability and improve control. Met 12/28/17  3. Pt will improve hip strength in order to improve pelvic stability.  4. Pt will tolerate HEP to improve impairments and independence with ADL's. Partially met     Long Term Goals: 12 weeks (2/12/17)  1. Pt will be able to perform 3-5 second kegal x 5 reps in order to improve management of urinary frequency. Met 1/22/18  2. Pt will complete a bladder diary in order to address behavioral components of urinary frequency. Met  3. Pt will be independent with HEP and self management. Partially met     CMS Impairment/Limitation/Restriction for Urinary Problem Survey  Status Limitation G-Code CMS Severity Modifier  Intake 57% 43%  Predicted 68% 32% Goal Status+ CJ - At least 20 percent but less than 40 percent  1/12/2018 61% 39% Current Status CJ - At least 20 percent but less than 40 percent     CMS Impairment/Limitation/Restriction for Bowel Cnst Survey  Status Limitation G-Code CMS Severity Modifier  Intake 60% 40%  Predicted 67% 33% Goal Status+ CJ - At least 20 percent but less than 40 percent  1/12/2018 47% 53% Current Status CK - At least 40 percent but less than 60 percent    Plan     Continue with established POC, working toward PT goals.

## 2018-01-30 ENCOUNTER — PATIENT MESSAGE (OUTPATIENT)
Dept: REHABILITATION | Facility: HOSPITAL | Age: 83
End: 2018-01-30

## 2018-01-31 ENCOUNTER — OFFICE VISIT (OUTPATIENT)
Dept: UROGYNECOLOGY | Facility: CLINIC | Age: 83
End: 2018-01-31
Payer: MEDICARE

## 2018-01-31 VITALS
HEIGHT: 67 IN | BODY MASS INDEX: 21.38 KG/M2 | DIASTOLIC BLOOD PRESSURE: 92 MMHG | WEIGHT: 136.25 LBS | SYSTOLIC BLOOD PRESSURE: 160 MMHG

## 2018-01-31 DIAGNOSIS — N81.11 MIDLINE CYSTOCELE: ICD-10-CM

## 2018-01-31 DIAGNOSIS — R39.15 URINARY URGENCY: ICD-10-CM

## 2018-01-31 DIAGNOSIS — K58.0 IRRITABLE BOWEL SYNDROME WITH DIARRHEA: ICD-10-CM

## 2018-01-31 DIAGNOSIS — K59.09 CHRONIC CONSTIPATION: ICD-10-CM

## 2018-01-31 DIAGNOSIS — N39.46 URINARY INCONTINENCE, MIXED: ICD-10-CM

## 2018-01-31 DIAGNOSIS — K59.00 CONSTIPATION, UNSPECIFIED CONSTIPATION TYPE: ICD-10-CM

## 2018-01-31 DIAGNOSIS — N81.6 RECTOCELE, FEMALE: ICD-10-CM
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
## 2018-01-31 DIAGNOSIS — N95.2 ATROPHIC VAGINITIS: ICD-10-CM

## 2018-01-31 PROCEDURE — 99213 OFFICE O/P EST LOW 20 MIN: CPT | Mod: PBBFAC | Performed by: OBSTETRICS & GYNECOLOGY

## 2018-01-31 PROCEDURE — 99999 PR PBB SHADOW E&M-EST. PATIENT-LVL III: CPT | Mod: PBBFAC,,, | Performed by: OBSTETRICS & GYNECOLOGY

## 2018-01-31 PROCEDURE — 1159F MED LIST DOCD IN RCRD: CPT | Mod: ,,, | Performed by: OBSTETRICS & GYNECOLOGY

## 2018-01-31 PROCEDURE — 99215 OFFICE O/P EST HI 40 MIN: CPT | Mod: S$PBB,,, | Performed by: OBSTETRICS & GYNECOLOGY

## 2018-01-31 PROCEDURE — 1126F AMNT PAIN NOTED NONE PRSNT: CPT | Mod: ,,, | Performed by: OBSTETRICS & GYNECOLOGY

## 2018-01-31 RX ORDER — DOCUSATE SODIUM 100 MG/1
100 CAPSULE, LIQUID FILLED ORAL 2 TIMES DAILY
Qty: 60 CAPSULE | Refills: 11 | Status: SHIPPED | OUTPATIENT
Start: 2018-01-31 | End: 2018-03-02

## 2018-01-31 NOTE — PROGRESS NOTES
Urogyn follow up    [facility] [address] [place] [address]    Ana Johnston  [id number]  9/21/1930      Ana Johnston is a 87 y.o. here for a urogyn follow up.    The patient's son was present for all major portions of the history taking and discussion after exam.     HPI:  87 y/o, pmhx notable for AFib (not on anticoagulant), IBS, HTN, anxiety, and hyper parathryroidism.    1)  UI:  (--) NICOLA <  (--) UUI X 2years. +increased urgency--worried may start to leak.   (--) pads:  Daytime frequency: Q < 1 hours. Tried VESIcare --helped years ago.  When restarted recently, did not help. Oxybutynin did not help.  Nocturia: Yes: 4/night.   (--) dysuria,  (--) hematuria,  (--) frequent UTIs.  Sometimes has some incomplete bladder emptying, ryan in the middle of the night.    2)  POP:  Feels bulge sometimes.   (--) vaginal bleeding. (--) vaginal discharge. (--) sexually active.  (--) dyspareunia.   (--)  Vaginal dryness.  (--) vaginal estrogen use.   --POP-Q:  Aa 0; Ba 0; C +2; Ap 0; Bp 0; D -5.  Genital hiatus 3, perineal body 2. total vaginal length 8-9.  --The patient was fit with #3 ring + support pessary.  She was able to tolerate the device comfortabley with bending, squatting, valsalva.  She was not able to demonstrate independent removal and placement.  She tolerated the procedure well.    --PVR 20 mL    3)  BM:  (+) constipation/straining (uses suppositories, irregular, on regular lomotil) will sometimes go 1 week without BM).  (+) chronic diarrhea (q daily). (--) hematochezia.  (--) fecal incontinence.  (--) fecal smearing/urgency.  (+) complete evacuation.      Past Medical History  Past Medical History:   Diagnosis Date    AF (paroxysmal atrial fibrillation) 6/22/2017    Anemia     Anxiety     Arthritis     Cataract     Disorder of kidney and ureter     Encounter for blood transfusion     Hypertension     OAB (overactive bladder) 8/22/2017    Osteoporosis      Polyuria     Thyroid disease         Past Surgical History  Past Surgical History:   Procedure Laterality Date    ADENOIDECTOMY      CATARACT EXTRACTION      HYSTERECTOMY N/A     KNEE SURGERY      TONSILLECTOMY        Hysterectomy: Yes  Date: .  Indication: Fibroids.    Type: xlap/JOSE  Cervix present: Yes   Ovaries present: Yes   Other procedures at time of hysterectomy:  Appendectomy    Past Ob History     x  Yes    Largest infant weight:  8#9  unknown FAVD. unknown episiotomy.      Gynecologic History  LMP: No LMP recorded. Patient has had a hysterectomy.  Age of menarche: 11  Age of menopause: Early 50s  Menstrual history: Regular  Pap test: 20 years ago.  History of abnormal paps: No.  History of STIs:  No  Mammogram: Date of last: 3/2016.  Result: Normal  Colonoscopy: Date of last: 2016.  Result:  Normal.  Repeat due:  N/A.    DEXA:  Date of last:  Unknown, reports history of osteoperosis (hx of colchisine usage)    Issues include:  Patient Active Problem List   Diagnosis    Chronic midline low back pain without sciatica    Hypertension, well controlled    Irritable bowel syndrome with diarrhea    AF (paroxysmal atrial fibrillation)    Primary hyperparathyroidism    Hypercalcemia    Essential hypertension    Generalized OA    Anxiety    OAB (overactive bladder)    Urinary urgency    Increased frequency of urination    Chronic constipation    Cervical prolapse    Rectocele, female    Midline cystocele    Nocturia    Atrophic vaginitis    Urinary incontinence, mixed    Pelvic floor weakness       History since last visit:     1)  Urinary urgency/frequency:    --urine C&S  NEG  --Daytime frequency: Q < 1 hours.  If holds, feels pain in bladder area.     --has been attending PT--doesn't think this is helping much; can't quantify U/F.  Feels that doing exercises makes her have increased bladder pressure/U/F.    --is only drinking 3 glasses water/day  --making sure you  always have a nearby bathroom  --kept voiding diary x 3 days in 7/17:  1st 2 days (3 glasses of water x 8 oz) had freq Qh; last day had freq Q2-3h (4 x 8 oz of water)  --has been using vaginal estrogen 2x/week    2) Constipation:  --taking metamucil (1 T) daily  --has good and bad days    3)  Nocturia (nighttime urination):   --baseline: 4/night.   --was 2x/PM x a few nights  --stops fluids 2 hours before bed/no water by bed  --does not have leg and swelling     4)  Stage 3 cervical prolapse, stage 2 cystocele/rectocele:  --wearing #3 ring + support  --feels is helping bulge  --no issues: no VB, pain, discharge    5)  Fall last week:  --feel on buttocks  --has noted some new lumps on B breast--she states appeared after fall  --does not think she hit her head but not sure  --since fall, has had some R heel pain    Medications:    Current Outpatient Prescriptions:     ACETAMINOPHEN (TYLENOL ORAL), Take by mouth 2 (two) times daily as needed. , Disp: , Rfl:     aspirin 325 MG tablet, Take 1 tablet (325 mg total) by mouth once daily., Disp: , Rfl: 0    diphenoxylate-atropine 2.5-0.025 mg (LOMOTIL) 2.5-0.025 mg per tablet, Take 1 tablet by mouth 4 (four) times daily as needed for Diarrhea., Disp: , Rfl:     estradiol (ESTRACE) 0.01 % (0.1 mg/gram) vaginal cream, Place 0.5 g vaginally twice a week., Disp: 42.5 g, Rfl: 11    FOLIC ACID/MULTIVIT-MIN/LUTEIN (CENTRUM SILVER ORAL), Take by mouth once daily at 6am., Disp: , Rfl:     glycerin adult suppository, Place 1 suppository rectally as needed for Constipation., Disp: , Rfl:     losartan-hydrochlorothiazide 50-12.5 mg (HYZAAR) 50-12.5 mg per tablet, TAKE 1 TABLET BY MOUTH DAILY. DISCONTINUE LOSARTAN, Disp: 30 tablet, Rfl: 2    metoprolol succinate (TOPROL-XL) 25 MG 24 hr tablet, Take 1 tablet (25 mg total) by mouth once daily., Disp: 90 tablet, Rfl: 2    sertraline (ZOLOFT) 100 MG tablet, Take 1 tablet (100 mg total) by mouth once daily., Disp: 90 tablet, Rfl:  2    ROS:  As per HPI.      Exam  There were no vitals taken for this visit.  General: alert and oriented, no acute distress  Abd: soft, non-tender, non-distended    Pelvic  Ext. Genitalia: normal external genitalia. Normal bartholin's and skenes glands  Vagina: #3 ring + support pessary in place.  + atrophy. Normal vaginal mucosa without lesions. No discharge noted.   Non-tender bladder base without palpable mass.  Cervix: no lesions  Uterus:  uterus is absent   Urethra: no masses or tenderness  Urethral meatus: no lesions, caruncle or prolapse.  #3 ring + support pessary cleaned and replaced without issue.     **Patient was able to hold bladder >1.5 hour w/o U/F issues during visit today.     Impression  No diagnosis found.  We reviewed the above issues and discussed options for short-term versus long-term management of her problems.   Plan:   1)  Urinary urgency/frequency:    --increase hydration to at least 6 x 8 oz of water a day   --urine C&S sent  --make sure you always have a nearby bathroom  --Empty bladder every 3 hours.  Empty well: wait a minute, lean forward on toilet.    --Avoid dietary irritants (see sheet).  Keep diary x 3-5 days to determine your irritants.  --For dry mouth: get sour, sugar free lozenge or gum.    --continue pelvic floor PT exercises daily  --URGE: consider medication in future.  Takes 2-4 weeks to see if will have effect.  For dry mouth: get sour, sugar free lozenge or gum.  Hx of Vesicare, Oxybutynin (lack of results)  --keep voiding diary with voided amounts x 2 days (measure all voids in hat)--then send to me through [employer] or fax; based on this, will give you instructions to start timed voids    2) Constipation:  --continue fiber 1 Tablespoon a day; add  prune juice (warmed) 1/2-1 cup in AM  --take 400 mg magnesium oxide daily (get over the counter)  --for episodes of constipation:  Take1-2 stool softeners a day as needed (rx sent to [facility] but may need to get over the  counter)  --controlling bowel movements may help with bladder issues    3)  Nocturia (nighttime urination): stop fluids 2 hours before bed/no water by bed.  If have leg swelling:  Elevate feet above chest x 1 hour before bed to get excess fluid off.  Can also use support hose (knee highs).      4)  Stage 3 cervical prolapse, stage 2 cystocele/rectocele:  --trial of pessary: patient fit with #3 ring + support.  Pessary order form completed, and will restock [facility] with incoming pessary.   --RTC every 3 months for checks    5)   Vaginal atrophy (dryness):  Use dime-sized amount of estrogen cream in vagina and around opening twice a week.    Apply internally to knuckle and around vaginal opening.      6)   Fall last week:  --schedule imaging  --breast exam:  Benign (areas of tenderness at ribs)    7)   RTC 3 months.  Will call you with next instructions once we receive voiding diary.  Will call you with imaging results.      >60 minutes were spent in face to face time with this patient  90 % of this time was spent in counseling and/or coordination of care  ME@  [facility]  Division of Female Pelvic Medicine and Reconstructive Surgery  Department of Obstetrics & Gynecology

## 2018-01-31 NOTE — PATIENT INSTRUCTIONS
1)  Urinary urgency/frequency:    --increase hydration to at least 6 x 8 oz of water a day   --urine C&S sent  --make sure you always have a nearby bathroom  --Empty bladder every 3 hours.  Empty well: wait a minute, lean forward on toilet.    --Avoid dietary irritants (see sheet).  Keep diary x 3-5 days to determine your irritants.  --For dry mouth: get sour, sugar free lozenge or gum.    --continue pelvic floor PT exercises daily  --URGE: consider medication in future.  Takes 2-4 weeks to see if will have effect.  For dry mouth: get sour, sugar free lozenge or gum.  Hx of Vesicare, Oxybutynin (lack of results)  --keep voiding diary with voided amounts x 2 days (measure all voids in hat)--then send to me through [employer] or fax; based on this, will give you instructions to start timed voids    2) Constipation:  --continue fiber 1 Tablespoon a day; add  prune juice (warmed) 1/2-1 cup in AM  --take 400 mg magnesium oxide daily (get over the counter)  --for episodes of constipation:  Take1-2 stool softeners a day as needed (rx sent to [facility] but may need to get over the counter)  --controlling bowel movements may help with bladder issues    3)  Nocturia (nighttime urination): stop fluids 2 hours before bed/no water by bed.  If have leg swelling:  Elevate feet above chest x 1 hour before bed to get excess fluid off.  Can also use support hose (knee highs).      4)  Stage 3 cervical prolapse, stage 2 cystocele/rectocele:  --trial of pessary: patient fit with #3 ring + support.  Pessary order form completed, and will restock [facility] with incoming pessary.   --RTC every 3 months for checks    5)   Vaginal atrophy (dryness):  Use dime-sized amount of estrogen cream in vagina and around opening twice a week.    Apply internally to knuckle and around vaginal opening.      6)   Fall last week:  --schedule imaging  --breast exam:  Benign (areas of tenderness at ribs)    7)   RTC 3 months.  Will call you with next instructions once we  receive voiding diary.  Will call you with imaging results.

## 2018-02-02 ENCOUNTER — PATIENT MESSAGE (OUTPATIENT)
Dept: FAMILY MEDICINE | Facility: CLINIC | Age: 83
End: 2018-02-02

## 2018-02-05 ENCOUNTER — HOSPITAL ENCOUNTER (OUTPATIENT)
Dept: RADIOLOGY | Facility: HOSPITAL | Age: 83
Discharge: HOME | End: 2018-02-05
Attending: OBSTETRICS & GYNECOLOGY
Payer: MEDICARE

## 2018-02-05 ENCOUNTER — TELEPHONE (OUTPATIENT)
Dept: UROGYNECOLOGY | Facility: CLINIC | Age: 83
End: 2018-02-05

## 2018-02-05 DIAGNOSIS — S92.354A CLOSED NONDISPLACED FRACTURE OF FIFTH METATARSAL BONE OF RIGHT FOOT, INITIAL ENCOUNTER: Primary | ICD-10-CM
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
## 2018-02-05 PROCEDURE — 73650 X-RAY EXAM OF HEEL: CPT | Mod: TC,PO,RT

## 2018-02-05 PROCEDURE — 73521 X-RAY EXAM HIPS BI 2 VIEWS: CPT | Mod: 26,,, | Performed by: RADIOLOGY

## 2018-02-05 PROCEDURE — 71110 X-RAY EXAM RIBS BIL 3 VIEWS: CPT | Mod: 26,,, | Performed by: RADIOLOGY

## 2018-02-05 PROCEDURE — 72100 X-RAY EXAM L-S SPINE 2/3 VWS: CPT | Mod: TC,PO

## 2018-02-05 PROCEDURE — 73521 X-RAY EXAM HIPS BI 2 VIEWS: CPT | Mod: TC,PO

## 2018-02-05 PROCEDURE — 73650 X-RAY EXAM OF HEEL: CPT | Mod: 26,RT,, | Performed by: RADIOLOGY

## 2018-02-05 PROCEDURE — 71110 X-RAY EXAM RIBS BIL 3 VIEWS: CPT | Mod: TC,PO

## 2018-02-05 PROCEDURE — 72100 X-RAY EXAM L-S SPINE 2/3 VWS: CPT | Mod: 26,,, | Performed by: RADIOLOGY

## 2018-02-05 NOTE — TELEPHONE ENCOUNTER
Spoke with patient's son.  Let him know patient has 5th metatarsal Fx R and several L rib Fxs.  Advised usually expectant mgmt of rib Fx + NSAID use PRN but needs to see ortho for metatarsal Fx for consideration of any needed aircast, etc, to avoid weight-bearing and allow healing.  Also needs some regular foot Xrays.  He voiced understanding.  Will have someone call and help them get this scheduled.  Reviewed need to work on fall-proofing patient's home to minimize fall risk for the future.   ----------------------------------    Please call patient's son and help him make appt for patient to have Right foot xray and then orthopedic consult for his mom same day (can be midlevel)--for R foot fracture.  Thanks!

## 2018-02-06 NOTE — TELEPHONE ENCOUNTER
Attempted to contact pt's son to schedule xray and ortho consult no answer left message for him to contact the office.

## 2018-02-06 NOTE — TELEPHONE ENCOUNTER
Spoke to pt's son and set up appointments for him and provided numbers for the departments just incase he needs directions or to R/S. He voiced understanding and call ended.

## 2018-02-07 ENCOUNTER — HOSPITAL ENCOUNTER (OUTPATIENT)
Dept: RADIOLOGY | Facility: HOSPITAL | Age: 83
Discharge: HOME | End: 2018-02-07
Attending: OBSTETRICS & GYNECOLOGY
Payer: MEDICARE

## 2018-02-07 DIAGNOSIS — S92.354A CLOSED NONDISPLACED FRACTURE OF FIFTH METATARSAL BONE OF RIGHT FOOT, INITIAL ENCOUNTER: ICD-10-CM

## 2018-02-07 PROCEDURE — 73630 X-RAY EXAM OF FOOT: CPT | Mod: TC,PO,RT

## 2018-02-07 PROCEDURE — 73630 X-RAY EXAM OF FOOT: CPT | Mod: 26,RT,, | Performed by: RADIOLOGY

## 2018-02-09 ENCOUNTER — OFFICE VISIT (OUTPATIENT)
Dept: ORTHOPEDICS | Facility: CLINIC | Age: 83
End: 2018-02-09
Payer: MEDICARE

## 2018-02-09 ENCOUNTER — PATIENT MESSAGE (OUTPATIENT)
Dept: UROGYNECOLOGY | Facility: CLINIC | Age: 83
End: 2018-02-09

## 2018-02-09 VITALS — HEIGHT: 63 IN | WEIGHT: 138 LBS | BODY MASS INDEX: 24.45 KG/M2

## 2018-02-09 DIAGNOSIS — S92.351A CLOSED FRACTURE OF FIFTH METATARSAL BONE OF RIGHT FOOT, INITIAL ENCOUNTER: Primary | ICD-10-CM

## 2018-02-09 PROCEDURE — 99213 OFFICE O/P EST LOW 20 MIN: CPT | Mod: PBBFAC | Performed by: PHYSICIAN ASSISTANT

## 2018-02-09 PROCEDURE — 99203 OFFICE O/P NEW LOW 30 MIN: CPT | Mod: S$PBB,,, | Performed by: PHYSICIAN ASSISTANT

## 2018-02-09 PROCEDURE — 1159F MED LIST DOCD IN RCRD: CPT | Mod: ,,, | Performed by: PHYSICIAN ASSISTANT

## 2018-02-09 PROCEDURE — 99999 PR PBB SHADOW E&M-EST. PATIENT-LVL III: CPT | Mod: PBBFAC,,, | Performed by: PHYSICIAN ASSISTANT

## 2018-02-09 PROCEDURE — 1126F AMNT PAIN NOTED NONE PRSNT: CPT | Mod: ,,, | Performed by: PHYSICIAN ASSISTANT

## 2018-02-09 NOTE — PROGRESS NOTES
Subjective:      Patient ID: Ana Johnston is a 87 y.o. female.    Chief Complaint: No chief complaint on file.    HPI  87 year old female presents for evaluation of her right foot. On 1/25/18 she fell in her apartment. She had pain with WB. Last week x-rays were obtained which show a 5th MT fx. She denies pain. She is ambulating in her regular shoes without pain. She has some swelling. She takes tylenol as needed.   Review of Systems   Constitution: Negative for chills, fever and night sweats.   Cardiovascular: Negative for chest pain.   Respiratory: Negative for cough and shortness of breath.    Hematologic/Lymphatic: Does not bruise/bleed easily.   Skin: Negative for color change.   Gastrointestinal: Negative for heartburn.   Genitourinary: Negative for dysuria.   Neurological: Negative for numbness and paresthesias.   Psychiatric/Behavioral: Negative for altered mental status.   Allergic/Immunologic: Negative for persistent infections.         Objective:            General    Vitals reviewed.  Constitutional: She is oriented to person, place, and time. She appears well-developed and well-nourished.   Cardiovascular: Normal rate.    Neurological: She is alert and oriented to person, place, and time.         Right Ankle/Foot Exam     Inspection   Erythema: absent    Tenderness   The patient is tender to palpation of the fifth metatarsal base.    Range of Motion   The patient has normal right ankle ROM.    Other   Sensation: normal    Comments:  Mild TTP 5th MT base. Mild swelling at foot.         Vascular Exam     Right Pulses  Dorsalis Pedis:      2+              X-ray: reviewed by myself. Fracture at the base of the fifth metacarpal noted.  Degenerative changes first MTP joint.  Bones are demineralized.  Soft tissue calcifications noted.        Assessment:       Encounter Diagnosis   Name Primary?    Closed fracture of fifth metatarsal bone of right foot, initial encounter Yes          Plan:       Patient is  doing well and denies pain. Elevate for swelling. She wishes to f/u as needed.

## 2018-02-12 ENCOUNTER — TELEPHONE (OUTPATIENT)
Dept: UROGYNECOLOGY | Facility: CLINIC | Age: 83
End: 2018-02-12

## 2018-02-12 NOTE — TELEPHONE ENCOUNTER
Spoke with pt son Mansoor and relayed message given. Also informed Mansoor 6 month recall has been placed. Mansoor voiced understanding and stated ortho apt is scheduled. Call ended.

## 2018-02-12 NOTE — TELEPHONE ENCOUNTER
Voiding diary received and reviewed:  Freq: Q2-4 hours awake, 1-3 times/PM  Volumes: 2-4 oz    This appears pretty normal.    Attempted to call patient's son to discuss.  Left VM for him to call us back.   -------------------------------------  Can you please call patient's son and let him know the followin)  Patient voiding diary shows normal frequency and volume voids.  She was also able to hold her bladder 1.5 hours during her last visit with me. I would encourage her that she seems to be doing normal things, and maybe she should try not to focus on the urge as much.  When she feels a small urge, she should do a Kegel, and wait.  If it's been 2-3 hours, she can urinate, but otherwise, she needs to practice the exercises to suppress the small urges in between real urges.  Also, she should stay busy, as this will help her not to focus so much on these things.  2)  Continue behavioral modifications as we discussed at last visit. Continue to watch bowel movements.  Continue to use vaginal estrogen.   3)  Follow up with ortho re: foot fracture.   4)  Can you help them make appt to see us back in about 6 months to see how things are going?      Thanks!

## 2018-02-26 RX ORDER — LOSARTAN POTASSIUM AND HYDROCHLOROTHIAZIDE 12.5; 5 MG/1; MG/1
TABLET ORAL
Qty: 30 TABLET | Refills: 2 | Status: SHIPPED | OUTPATIENT
Start: 2018-02-26 | End: 2018-04-30 | Stop reason: SDUPTHER

## 2018-04-30 RX ORDER — LOSARTAN POTASSIUM AND HYDROCHLOROTHIAZIDE 12.5; 5 MG/1; MG/1
TABLET ORAL
Qty: 30 TABLET | Refills: 2 | Status: SHIPPED | OUTPATIENT
Start: 2018-04-30 | End: 2018-06-22 | Stop reason: SDUPTHER

## 2018-05-03 NOTE — TELEPHONE ENCOUNTER
----- Message from Jocelyn Mayorga sent at 5/3/2018 10:01 AM CDT -----  Contact: Son Antolin   [phone]  Pt son is returning your office call. Pls call pt.kandy Dangelo [phone].  Thanks...............Snow

## 2018-06-09 DIAGNOSIS — I48.0 AF (PAROXYSMAL ATRIAL FIBRILLATION): ICD-10-CM

## 2018-06-11 RX ORDER — METOPROLOL SUCCINATE 25 MG/1
25 TABLET, EXTENDED RELEASE ORAL DAILY
Qty: 90 TABLET | Refills: 3 | Status: SHIPPED | OUTPATIENT
Start: 2018-06-11 | End: 2018-06-22 | Stop reason: SDUPTHER

## 2018-06-22 ENCOUNTER — LAB VISIT (OUTPATIENT)
Dept: LAB | Facility: HOSPITAL | Age: 83
End: 2018-06-22
Attending: FAMILY MEDICINE
Payer: MEDICARE

## 2018-06-22 ENCOUNTER — TELEPHONE (OUTPATIENT)
Dept: FAMILY MEDICINE | Facility: CLINIC | Age: 83
End: 2018-06-22

## 2018-06-22 ENCOUNTER — OFFICE VISIT (OUTPATIENT)
Dept: FAMILY MEDICINE | Facility: CLINIC | Age: 83
End: 2018-06-22
Payer: MEDICARE

## 2018-06-22 VITALS
TEMPERATURE: 99 F | BODY MASS INDEX: 24.92 KG/M2 | WEIGHT: 140.63 LBS | RESPIRATION RATE: 20 BRPM | OXYGEN SATURATION: 96 % | HEART RATE: 64 BPM | SYSTOLIC BLOOD PRESSURE: 158 MMHG | HEIGHT: 63 IN | DIASTOLIC BLOOD PRESSURE: 80 MMHG

## 2018-06-22 DIAGNOSIS — F41.9 ANXIETY: ICD-10-CM

## 2018-06-22 DIAGNOSIS — Z23 NEED FOR PNEUMOCOCCAL VACCINATION: ICD-10-CM

## 2018-06-22 DIAGNOSIS — I48.0 AF (PAROXYSMAL ATRIAL FIBRILLATION): ICD-10-CM

## 2018-06-22 DIAGNOSIS — I10 HYPERTENSION, WELL CONTROLLED: ICD-10-CM

## 2018-06-22 DIAGNOSIS — K58.0 IRRITABLE BOWEL SYNDROME WITH DIARRHEA: ICD-10-CM

## 2018-06-22 DIAGNOSIS — N93.9 VAGINAL BLEEDING: Primary | ICD-10-CM

## 2018-06-22 LAB
ALBUMIN SERPL BCP-MCNC: 3.8 G/DL
ALP SERPL-CCNC: 76 U/L
ALT SERPL W/O P-5'-P-CCNC: 13 U/L
ANION GAP SERPL CALC-SCNC: 10 MMOL/L
AST SERPL-CCNC: 22 U/L
BILIRUB SERPL-MCNC: 0.7 MG/DL
BUN SERPL-MCNC: 29 MG/DL
CALCIUM SERPL-MCNC: 10.8 MG/DL
CHLORIDE SERPL-SCNC: 100 MMOL/L
CHOLEST SERPL-MCNC: 183 MG/DL
CHOLEST/HDLC SERPL: 2.8 {RATIO}
CO2 SERPL-SCNC: 32 MMOL/L
CREAT SERPL-MCNC: 0.9 MG/DL
EST. GFR  (AFRICAN AMERICAN): >60 ML/MIN/1.73 M^2
EST. GFR  (NON AFRICAN AMERICAN): 57.7 ML/MIN/1.73 M^2
GLUCOSE SERPL-MCNC: 85 MG/DL
HDLC SERPL-MCNC: 66 MG/DL
HDLC SERPL: 36.1 %
LDLC SERPL CALC-MCNC: 84.4 MG/DL
NONHDLC SERPL-MCNC: 117 MG/DL
POTASSIUM SERPL-SCNC: 4.4 MMOL/L
PROT SERPL-MCNC: 7.2 G/DL
SODIUM SERPL-SCNC: 142 MMOL/L
TRIGL SERPL-MCNC: 163 MG/DL

## 2018-06-22 PROCEDURE — 99214 OFFICE O/P EST MOD 30 MIN: CPT | Mod: S$PBB,,, | Performed by: FAMILY MEDICINE

## 2018-06-22 PROCEDURE — 80053 COMPREHEN METABOLIC PANEL: CPT

## 2018-06-22 PROCEDURE — G0009 ADMIN PNEUMOCOCCAL VACCINE: HCPCS | Mod: PBBFAC,PO

## 2018-06-22 PROCEDURE — 99214 OFFICE O/P EST MOD 30 MIN: CPT | Mod: PBBFAC,PO | Performed by: FAMILY MEDICINE

## 2018-06-22 PROCEDURE — 80061 LIPID PANEL: CPT

## 2018-06-22 PROCEDURE — 99999 PR PBB SHADOW E&M-EST. PATIENT-LVL IV: CPT | Mod: PBBFAC,,, | Performed by: FAMILY MEDICINE

## 2018-06-22 PROCEDURE — 36415 COLL VENOUS BLD VENIPUNCTURE: CPT | Mod: PO

## 2018-06-22 RX ORDER — SERTRALINE HYDROCHLORIDE 100 MG/1
100 TABLET, FILM COATED ORAL DAILY
Qty: 90 TABLET | Refills: 3 | Status: SHIPPED | OUTPATIENT
Start: 2018-06-22 | End: 2018-06-22 | Stop reason: SDUPTHER

## 2018-06-22 RX ORDER — METOPROLOL SUCCINATE 25 MG/1
25 TABLET, EXTENDED RELEASE ORAL DAILY
Qty: 90 TABLET | Refills: 3 | Status: SHIPPED | OUTPATIENT
Start: 2018-06-22 | End: 2018-06-22 | Stop reason: SDUPTHER

## 2018-06-22 RX ORDER — METOPROLOL SUCCINATE 25 MG/1
25 TABLET, EXTENDED RELEASE ORAL DAILY
Qty: 90 TABLET | Refills: 3 | Status: SHIPPED | OUTPATIENT
Start: 2018-06-22 | End: 2019-07-30 | Stop reason: SDUPTHER

## 2018-06-22 RX ORDER — LOSARTAN POTASSIUM AND HYDROCHLOROTHIAZIDE 12.5; 5 MG/1; MG/1
TABLET ORAL
Qty: 90 TABLET | Refills: 3 | Status: SHIPPED | OUTPATIENT
Start: 2018-06-22 | End: 2018-06-22 | Stop reason: SDUPTHER

## 2018-06-22 RX ORDER — LOSARTAN POTASSIUM AND HYDROCHLOROTHIAZIDE 12.5; 5 MG/1; MG/1
TABLET ORAL
Qty: 90 TABLET | Refills: 3 | Status: SHIPPED | OUTPATIENT
Start: 2018-06-22 | End: 2019-07-30 | Stop reason: SDUPTHER

## 2018-06-22 RX ORDER — SERTRALINE HYDROCHLORIDE 100 MG/1
100 TABLET, FILM COATED ORAL DAILY
Qty: 90 TABLET | Refills: 3 | Status: SHIPPED | OUTPATIENT
Start: 2018-06-22 | End: 2019-07-30 | Stop reason: SDUPTHER

## 2018-06-22 NOTE — PROGRESS NOTES
Subjective:       Patient ID: Ana Johnston is a 87 y.o. female.    Chief Complaint:     HPI        Vaginal bleeding - pt had vaginal bleeding x 1 episode 2 weeks ago. No repeat occurrences. Not a/w n/v/abd pain or dysuria.     Excess gas - pt is complains of frequent gas, but has been taking gasx to some effect. No constipation or abnormal appearing stools.     Pt takes tylenol BID for variosu aches and pains, but it does work well for her.     Htn - pt is adherent     Afib - on aspirin for cards without side effects.     Anxiety - pt is adherent with zoloft 100mg. It has been working well for her and has decreased her anxiety significantly.     Pt had an episode of dysphagia with magnesium 400mg, but since then has not been taking it since then  And her sxs have resolved.           Outpatient Prescriptions Marked as Taking for the 6/22/18 encounter (Office Visit) with Tc Lemus MD   Medication Sig Dispense Refill    ACETAMINOPHEN (TYLENOL ORAL) Take by mouth 2 (two) times daily as needed.       aspirin 325 MG tablet Take 1 tablet (325 mg total) by mouth once daily.  0    diphenoxylate-atropine 2.5-0.025 mg (LOMOTIL) 2.5-0.025 mg per tablet Take 1 tablet by mouth 4 (four) times daily as needed for Diarrhea.      estradiol (ESTRACE) 0.01 % (0.1 mg/gram) vaginal cream Place 0.5 g vaginally twice a week. 42.5 g 11    FOLIC ACID/MULTIVIT-MIN/LUTEIN (CENTRUM SILVER ORAL) Take by mouth once daily at 6am.      glycerin adult suppository Place 1 suppository rectally as needed for Constipation.      losartan-hydrochlorothiazide 50-12.5 mg (HYZAAR) 50-12.5 mg per tablet TAKE 1 TABLET BY MOUTH DAILY. For high blood pressure 90 tablet 3    metoprolol succinate (TOPROL-XL) 25 MG 24 hr tablet Take 1 tablet (25 mg total) by mouth once daily. For high blood pressure and to slow the heart down 90 tablet 3    sertraline (ZOLOFT) 100 MG tablet Take 1 tablet (100 mg total) by mouth once daily. For anxiety 90 tablet  3    [DISCONTINUED] losartan-hydrochlorothiazide 50-12.5 mg (HYZAAR) 50-12.5 mg per tablet TAKE 1 TABLET BY MOUTH DAILY. DISCONTINUE LOSARTAN 30 tablet 2    [DISCONTINUED] losartan-hydrochlorothiazide 50-12.5 mg (HYZAAR) 50-12.5 mg per tablet TAKE 1 TABLET BY MOUTH DAILY. For high blood pressure 90 tablet 3    [DISCONTINUED] metoprolol succinate (TOPROL-XL) 25 MG 24 hr tablet TAKE 1 TABLET (25 MG TOTAL) BY MOUTH ONCE DAILY. 90 tablet 3    [DISCONTINUED] metoprolol succinate (TOPROL-XL) 25 MG 24 hr tablet Take 1 tablet (25 mg total) by mouth once daily. For high blood pressure and to slow the heart down 90 tablet 3    [DISCONTINUED] sertraline (ZOLOFT) 100 MG tablet Take 1 tablet (100 mg total) by mouth once daily. 90 tablet 2    [DISCONTINUED] sertraline (ZOLOFT) 100 MG tablet Take 1 tablet (100 mg total) by mouth once daily. For anxiety 90 tablet 3       Past Medical History:   Diagnosis Date    AF (paroxysmal atrial fibrillation) 6/22/2017    Anemia     Anxiety     Arthritis     Cataract     Disorder of kidney and ureter     Encounter for blood transfusion     Hypertension     OAB (overactive bladder) 8/22/2017    Osteoporosis     Polyuria     Thyroid disease        Family History   Problem Relation Age of Onset    Arthritis Mother     Thyroid disease Mother     Heart attack Father     Diabetes Father     Liver disease Father     Arthritis Sister     Osteoporosis Sister     Hypertension Sister     Early death Brother     Asthma Son     Tuberculosis Maternal Grandmother     Early death Sister     Thyroid disease Other     Cervical cancer Neg Hx     Endometrial cancer Neg Hx     Vaginal cancer Neg Hx         reports that she has quit smoking. She has never used smokeless tobacco. She reports that she drinks about 1.8 oz of alcohol per week . She reports that she does not use drugs.    Review of Systems   Constitutional: Negative for activity change and unexpected weight change.    HENT: Negative for hearing loss, rhinorrhea and trouble swallowing.    Eyes: Negative for discharge and visual disturbance.   Respiratory: Negative for chest tightness and wheezing.    Cardiovascular: Positive for chest pain. Negative for palpitations.   Gastrointestinal: Positive for constipation and diarrhea. Negative for blood in stool and vomiting.   Endocrine: Positive for polyuria. Negative for polydipsia.   Genitourinary: Negative for difficulty urinating, dysuria, hematuria and menstrual problem.   Musculoskeletal: Negative for arthralgias, joint swelling and neck pain.   Neurological: Positive for weakness. Negative for headaches.   Psychiatric/Behavioral: Positive for confusion. Negative for dysphoric mood.       Objective:     Vitals:    06/22/18 1017   BP: (!) 158/80   Pulse:    Resp:    Temp:         Physical Exam   Constitutional: She appears well-developed. No distress.   HENT:   Head: Normocephalic and atraumatic.   Eyes: Conjunctivae are normal. Right eye exhibits no discharge. Left eye exhibits no discharge. No scleral icterus.   Cardiovascular: Normal rate and regular rhythm.  Exam reveals no gallop and no friction rub.    Murmur (3/6) heard.  Pulmonary/Chest: Effort normal and breath sounds normal. No respiratory distress. She has no wheezes. She has no rales.   Musculoskeletal: She exhibits no edema.   Neurological: She is alert.   Skin: She is not diaphoretic.   Psychiatric: She has a normal mood and affect.   Vitals reviewed.    no carotid bruits  Assessment:       1. Vaginal bleeding    2. Anxiety    3. AF (paroxysmal atrial fibrillation)    4. Need for pneumococcal vaccination    5. Irritable bowel syndrome with diarrhea    6. Hypertension, well controlled        Plan:       Ana was seen today for annual exam.    Diagnoses and all orders for this visit:    Vaginal bleeding  New-patient advised that postmenopausal bleeding is dangerous until proven otherwise. Patient and her son were  advised to call her urinary oncologist immediately to have a visit.    Anxiety    -     sertraline (ZOLOFT) 100 MG tablet; Take 1 tablet (100 mg total) by mouth once daily. For anxiety  - Chronic - stable     Pt is doing well on current therapy. No side effects noted. Will continue current therapy.    AF (paroxysmal atrial fibrillation)    -     metoprolol succinate (TOPROL-XL) 25 MG 24 hr tablet; Take 1 tablet (25 mg total) by mouth once daily. For high blood pressure and to slow the heart down  - Chronic - stable     Pt is doing well on current therapy and is requesting a refill. No side effects noted. Will continue current therapy.         Need for pneumococcal vaccination  -     Pneumococcal Conjugate Vaccine (13 Valent) (IM)    Irritable bowel syndrome with diarrhea  chronic -0 stable - continue lomotil as needed.     Hypertension, well controlled  -     Lipid panel; Future  -     Comprehensive metabolic panel; Future  -         losartan-hydrochlorothiazide 50-12.5 mg (HYZAAR) 50-12.5 mg per tablet; TAKE 1 TABLET BY MOUTH DAILY. For high blood pressure  - Chronic - stable     Pt is doing well on current therapy and is requesting a refill. No side effects noted. Will continue current therapy.                 Follow-up in about 3 months (around 9/22/2018) for Hypertension, afib.        Pt verbalized understanding and agreed with our plan.

## 2018-06-22 NOTE — TELEPHONE ENCOUNTER
Please have them record her daily BPs and have her follow up in 2 weeks for elevated blood pressure if her numbers at home are often over 150/100

## 2018-06-22 NOTE — PROGRESS NOTES
Health Maintenance      Pneumococcal (65+) (2 of 2 - PCV13): Pending orders ; ok to get today

## 2018-06-24 ENCOUNTER — PATIENT MESSAGE (OUTPATIENT)
Dept: FAMILY MEDICINE | Facility: CLINIC | Age: 83
End: 2018-06-24

## 2018-06-24 ENCOUNTER — PATIENT MESSAGE (OUTPATIENT)
Dept: UROGYNECOLOGY | Facility: CLINIC | Age: 83
End: 2018-06-24

## 2018-06-25 ENCOUNTER — TELEPHONE (OUTPATIENT)
Dept: UROGYNECOLOGY | Facility: CLINIC | Age: 83
End: 2018-06-25

## 2018-06-25 DIAGNOSIS — E83.52 HYPERCALCEMIA: Primary | ICD-10-CM

## 2018-06-25 NOTE — TELEPHONE ENCOUNTER
Notified pt son, Antolin, regarding information below. Note to hold centrum silver until after repeat results. Pt scheduled for labs. [facility] message sent

## 2018-06-25 NOTE — TELEPHONE ENCOUNTER
----- Message from Tc Lemus MD sent at 6/25/2018  8:55 AM CDT -----  Please notify patient results are abnormal.Her calcium is high. Please stop an calcium supplementation that she could be on.   Please have the patient follow up with me in 3 weeks to repeat labs.  Thanks.

## 2018-06-25 NOTE — TELEPHONE ENCOUNTER
Spoke to pt's son, consult appointment scheduled 7/26 at [facility] location, pt's son aware and verbalizes understanding.

## 2018-06-29 ENCOUNTER — PATIENT MESSAGE (OUTPATIENT)
Dept: FAMILY MEDICINE | Facility: CLINIC | Age: 83
End: 2018-06-29

## 2018-07-08 ENCOUNTER — PATIENT MESSAGE (OUTPATIENT)
Dept: FAMILY MEDICINE | Facility: CLINIC | Age: 83
End: 2018-07-08

## 2018-07-08 DIAGNOSIS — I10 HYPERTENSION, UNCONTROLLED: Primary | ICD-10-CM

## 2018-07-09 RX ORDER — NIFEDIPINE 30 MG/1
30 TABLET, EXTENDED RELEASE ORAL DAILY
Qty: 90 TABLET | Refills: 2 | Status: SHIPPED | OUTPATIENT
Start: 2018-07-09 | End: 2018-07-12

## 2018-07-12 RX ORDER — AMLODIPINE BESYLATE 5 MG/1
5 TABLET ORAL DAILY
Qty: 90 TABLET | Refills: 2 | Status: SHIPPED | OUTPATIENT
Start: 2018-07-12 | End: 2019-04-03 | Stop reason: SDUPTHER

## 2018-07-12 NOTE — TELEPHONE ENCOUNTER
Per [facility] pharmacy insurance will cover amlodipine 5mg stating the procardia is non formulary. Please advise, thank you

## 2018-07-13 ENCOUNTER — PATIENT MESSAGE (OUTPATIENT)
Dept: FAMILY MEDICINE | Facility: CLINIC | Age: 83
End: 2018-07-13

## 2018-07-17 ENCOUNTER — OFFICE VISIT (OUTPATIENT)
Dept: FAMILY MEDICINE | Facility: CLINIC | Age: 83
End: 2018-07-17
Payer: MEDICARE

## 2018-07-17 ENCOUNTER — LAB VISIT (OUTPATIENT)
Dept: LAB | Facility: HOSPITAL | Age: 83
End: 2018-07-17
Attending: FAMILY MEDICINE
Payer: MEDICARE

## 2018-07-17 VITALS
HEIGHT: 63 IN | WEIGHT: 140.63 LBS | SYSTOLIC BLOOD PRESSURE: 158 MMHG | RESPIRATION RATE: 16 BRPM | OXYGEN SATURATION: 96 % | BODY MASS INDEX: 24.92 KG/M2 | TEMPERATURE: 98 F | DIASTOLIC BLOOD PRESSURE: 92 MMHG | HEART RATE: 82 BPM

## 2018-07-17 DIAGNOSIS — E83.52 HYPERCALCEMIA: ICD-10-CM

## 2018-07-17 DIAGNOSIS — E21.3 HYPERPARATHYROIDISM: ICD-10-CM

## 2018-07-17 DIAGNOSIS — I10 HYPERTENSION, UNCONTROLLED: Primary | ICD-10-CM

## 2018-07-17 DIAGNOSIS — R14.1 GAS PAIN: ICD-10-CM

## 2018-07-17 LAB — CA-I BLDV-SCNC: 1.25 MMOL/L

## 2018-07-17 PROCEDURE — 99999 PR PBB SHADOW E&M-EST. PATIENT-LVL III: CPT | Mod: PBBFAC,,, | Performed by: FAMILY MEDICINE

## 2018-07-17 PROCEDURE — 99214 OFFICE O/P EST MOD 30 MIN: CPT | Mod: S$PBB,,, | Performed by: FAMILY MEDICINE

## 2018-07-17 PROCEDURE — 99213 OFFICE O/P EST LOW 20 MIN: CPT | Mod: PBBFAC,PO | Performed by: FAMILY MEDICINE

## 2018-07-17 PROCEDURE — 82330 ASSAY OF CALCIUM: CPT

## 2018-07-17 PROCEDURE — 36415 COLL VENOUS BLD VENIPUNCTURE: CPT | Mod: PO

## 2018-07-17 NOTE — PROGRESS NOTES
Subjective:       Patient ID: Ana Johnston is a 87 y.o. female.    Chief Complaint: Hypertension (follow up )    HPI    Htn - pts blood pressure has been running high over the last few weeks when it was watched at home. I sent in amlidpine 5 days ago for her to start taking and her bp has improved.             Chronic gas pains - pt has daily gas pains that are affecting her quality of life. BMs are actually more normal.     Hypercalcemia - reviewed labs with pt and son. pt does have a h/o Hyperpth, but her last PTH level was normal. She cannto recall    No issues with kidney stones or constipation. She does have some anxiety which is chronic.   Outpatient Prescriptions Marked as Taking for the 7/17/18 encounter (Office Visit) with Tc Lemus MD   Medication Sig Dispense Refill    ACETAMINOPHEN (TYLENOL ORAL) Take by mouth 2 (two) times daily as needed.       amLODIPine (NORVASC) 5 MG tablet Take 1 tablet (5 mg total) by mouth once daily. 90 tablet 2    aspirin 325 MG tablet Take 1 tablet (325 mg total) by mouth once daily.  0    diphenoxylate-atropine 2.5-0.025 mg (LOMOTIL) 2.5-0.025 mg per tablet Take 1 tablet by mouth 4 (four) times daily as needed for Diarrhea.      estradiol (ESTRACE) 0.01 % (0.1 mg/gram) vaginal cream Place 0.5 g vaginally twice a week. 42.5 g 11    glycerin adult suppository Place 1 suppository rectally as needed for Constipation.      losartan-hydrochlorothiazide 50-12.5 mg (HYZAAR) 50-12.5 mg per tablet TAKE 1 TABLET BY MOUTH DAILY. For high blood pressure 90 tablet 3    metoprolol succinate (TOPROL-XL) 25 MG 24 hr tablet Take 1 tablet (25 mg total) by mouth once daily. For high blood pressure and to slow the heart down 90 tablet 3    sertraline (ZOLOFT) 100 MG tablet Take 1 tablet (100 mg total) by mouth once daily. For anxiety 90 tablet 3       Past Medical History:   Diagnosis Date    AF (paroxysmal atrial fibrillation) 6/22/2017    Anemia     Anxiety     Arthritis      Cataract     Disorder of kidney and ureter     Encounter for blood transfusion     Hypertension     OAB (overactive bladder) 8/22/2017    Osteoporosis     Polyuria     Thyroid disease        Family History   Problem Relation Age of Onset    Arthritis Mother     Thyroid disease Mother     Heart attack Father     Diabetes Father     Liver disease Father     Arthritis Sister     Osteoporosis Sister     Hypertension Sister     Early death Brother     Asthma Son     Tuberculosis Maternal Grandmother     Early death Sister     Thyroid disease Other     Cervical cancer Neg Hx     Endometrial cancer Neg Hx     Vaginal cancer Neg Hx         reports that she has quit smoking. She has never used smokeless tobacco. She reports that she drinks about 1.8 oz of alcohol per week . She reports that she does not use drugs.    Review of Systems   Respiratory: Negative for shortness of breath and wheezing.    Cardiovascular: Negative for chest pain and palpitations.   Gastrointestinal: Positive for abdominal distention. Negative for diarrhea.   Musculoskeletal: Negative for neck pain.   Neurological: Negative for headaches.       Objective:     Vitals:    07/17/18 1027   BP: (!) 158/92   Pulse:    Resp:    Temp:         Physical Exam   Constitutional: She appears well-developed. No distress.   HENT:   Head: Normocephalic and atraumatic.   Eyes: Conjunctivae are normal. Right eye exhibits no discharge. Left eye exhibits no discharge. No scleral icterus.   Cardiovascular: Normal rate, regular rhythm and normal heart sounds.  Exam reveals no gallop and no friction rub.    No murmur heard.  Pulmonary/Chest: Effort normal and breath sounds normal. No respiratory distress. She has no wheezes. She has no rales.   Musculoskeletal:   Mild thoracic kyphosis   Neurological: She is alert.   Skin: She is not diaphoretic.   Psychiatric: She has a normal mood and affect. Cognition and memory are impaired. She exhibits  abnormal recent memory.   Vitals reviewed.      Assessment:       1. Hypertension, uncontrolled    2. Hypercalcemia    3. Gas pain    4. Hyperparathyroidism        Plan:       Ana was seen today for hypertension.    Diagnoses and all orders for this visit:    Hypertension, uncontrolled  Will maintain patient on her addition of amlodipine which seems to have brought her Bps down to a more reasonable level (despite her readings today).   She will continue to have her blood pressure checked daily at [facility].     Hypercalcemia  -     Comprehensive metabolic panel; Future  -     Calcium, ionized; Future  Will recheck calcium today.     Gas pain  Will trial IBgard    Hyperparathyroidism  -     PTH, intact; Future  Pth today. Last was normal.     UPDATE pth and cmp were not drawn due to lab link error.          Follow-up in about 2 months (around 9/17/2018) for Hypertension, hypercalcemia, hyperpth.        Pt verbalized understanding and agreed with our plan.

## 2018-07-18 ENCOUNTER — TELEPHONE (OUTPATIENT)
Dept: FAMILY MEDICINE | Facility: CLINIC | Age: 83
End: 2018-07-18

## 2018-07-18 NOTE — TELEPHONE ENCOUNTER
----- Message from Tc Lemus MD sent at 7/18/2018  8:11 AM CDT -----  Please notify patient results are normal! Please have pt follow up or contact me if they have any questions. Follow up as previously discussed. Thanks.

## 2018-07-26 ENCOUNTER — INITIAL CONSULT (OUTPATIENT)
Dept: UROGYNECOLOGY | Facility: CLINIC | Age: 83
End: 2018-07-26
Payer: MEDICARE

## 2018-07-26 VITALS
WEIGHT: 140.63 LBS | HEIGHT: 63 IN | BODY MASS INDEX: 24.92 KG/M2 | SYSTOLIC BLOOD PRESSURE: 146 MMHG | DIASTOLIC BLOOD PRESSURE: 68 MMHG

## 2018-07-26 DIAGNOSIS — R35.0 INCREASED FREQUENCY OF URINATION: Primary | ICD-10-CM

## 2018-07-26 DIAGNOSIS — R35.1 NOCTURIA: ICD-10-CM

## 2018-07-26 DIAGNOSIS — N81.11 MIDLINE CYSTOCELE: ICD-10-CM

## 2018-07-26 DIAGNOSIS — R39.15 URINARY URGENCY: ICD-10-CM

## 2018-07-26 DIAGNOSIS — N39.46 URINARY INCONTINENCE, MIXED: ICD-10-CM

## 2018-07-26 DIAGNOSIS — N32.81 OAB (OVERACTIVE BLADDER): ICD-10-CM

## 2018-07-26 DIAGNOSIS — N95.2 ATROPHIC VAGINITIS: ICD-10-CM

## 2018-07-26 DIAGNOSIS — N81.6 RECTOCELE, FEMALE: ICD-10-CM

## 2018-07-26 DIAGNOSIS — K59.09 CHRONIC CONSTIPATION: ICD-10-CM

## 2018-07-26 DIAGNOSIS — F41.9 ANXIETY: ICD-10-CM

## 2018-07-26 DIAGNOSIS — K58.0 IRRITABLE BOWEL SYNDROME WITH DIARRHEA: ICD-10-CM

## 2018-07-26 DIAGNOSIS — N81.89 PELVIC FLOOR WEAKNESS: ICD-10-CM

## 2018-07-26 PROCEDURE — 87086 URINE CULTURE/COLONY COUNT: CPT

## 2018-07-26 PROCEDURE — 99214 OFFICE O/P EST MOD 30 MIN: CPT | Mod: S$PBB,,, | Performed by: OBSTETRICS & GYNECOLOGY

## 2018-07-26 PROCEDURE — 99999 PR PBB SHADOW E&M-EST. PATIENT-LVL III: CPT | Mod: PBBFAC,,, | Performed by: OBSTETRICS & GYNECOLOGY

## 2018-07-26 PROCEDURE — 99213 OFFICE O/P EST LOW 20 MIN: CPT | Mod: PBBFAC | Performed by: OBSTETRICS & GYNECOLOGY

## 2018-07-26 NOTE — PATIENT INSTRUCTIONS
How to prevent gas:  1. Eat Slowly   One of the major causes of gas in your digestive system is swallowed air. If you take the time to eat and drink in a slow, controlled manner, you minimize your intake of air. In other words, listen to what your mother told you and dont gulp down your food! If you have dentures, a poor fit can contribute to swallowing air when you eat.  FROM OUR FRIENDS AT   [employer]  [employer]ONS[employer]ED BY  [employer]   In your busy and connected life, the best time to prepare for an emergency is before it happens. Even careful, conscientious drivers can experience the unexpected out on the road.  Learn More   2. Dont Chew Gum or Suck on Hard Candy   Chewing gum can cause you to continuously swallow air, leading to excessive gas. Sucking on hard candies should also be avoided for the same reason.  3. Don't Smoke   Smoking is a major source of swallowed air. Besides all of the other negative health effects of smoking, you are likely to end up burping and passing gas more often.  4. Avoid Carbonated Beverages   The carbon dioxide infused into carbonated beverages introduces unnecessary gas into your digestive system. Avoid these if you have excess gas and bloating.  5. Avoid Diet Foods Containing Sorbitol, Mannitol, and Xylitol   Some diet foods contain the sugar substitutes sorbitol, mannitol, or xylitol. Intestinal gas can be increased when these are acted upon by intestinal bacteria. Read the label to be sure.  6. Choose Your Food Wisely   Certain foods have a reputation for contributing to gassiness. They contain substances that are not well digested and thus are available for intestinal bacteria to act on, with gas as a by-product. Learn which foods often contribute to gassiness and which foods are less likely to provoke gas.  The foods that typically cause gas include beans, legumes, artichokes, asparagus, broccoli, [place] sprouts, cauliflower, cabbage, mushrooms, onions, apples,  peaches, pears, bran, whole wheat, cheese, ice cream, yogurt, milk, fruit drinks, and drinks with high fructose corn syrup.  Researchers have also systematically identified a group of foods that appear to cause excessive gas in some individuals. These are known as FODMAPs foods and contain fermentable substances that are a problem for some people. This is an emerging area of interest for people who have a chronic problem with excessive gas and irritable bowel syndrome.  =--------------------------------------------------------------------------------    1)  Urinary urgency/frequency:    --increase hydration to at least 6 x 8 oz of water a day   --work on timed voids: SEE SHEET and COMPLETE.   --for flares: try uribel up to 4x/day; may make urine blue/green  --make sure you always have a nearby bathroom  --Empty bladder every 3 hours.  Empty well: wait a minute, lean forward on toilet.    --Avoid dietary irritants (see sheet).  Keep diary x 3-5 days to determine your irritants.  --For dry mouth: get sour, sugar free lozenge or gum.    --continue pelvic floor PT exercises daily  --URGE: consider medication in future.  Takes 2-4 weeks to see if will have effect.  For dry mouth: get sour, sugar free lozenge or gum.  Hx of Vesicare, Oxybutynin (lack of results)    2) Constipation with increased gas:  --continue fiber 1 Tablespoon a day  --for episodes of constipation:  Take1-2 stool softeners a day as needed (rx sent to [facility] but may need to get over the counter)  --controlling bowel movements may help with bladder issues  --look at gas sheet and work on things to reduce gas    3)  Nocturia (nighttime urination): stop fluids 2 hours before bed/no water by bed.  If have leg swelling:  Elevate feet above chest x 1 hour before bed to get excess fluid off.  Can also use support hose (knee highs).      4)  Stage 3 cervical prolapse, stage 2 cystocele/rectocele:  --trial of pessary: patient fit with #3 ring + support.  Pessary  order form completed, and will restock [facility] with incoming pessary.   --RTC every 3 months for checks    5)   Vaginal atrophy (dryness):  Use dime-sized amount of estrogen cream in vagina and around opening twice a week.    Apply internally to knuckle and around vaginal opening.      6)   RTC 3 months. With NP for pessary check.  Held bladder 1 hour today before urgency appreciated.

## 2018-07-26 NOTE — PROGRESS NOTES
Urogyn follow up    [facility] [address] [place] [address]    Ana Johnston  [id number]  9/21/1930      Ana Johnston is a 87 y.o. here for a urogyn follow up.    The patient's son was present for all major portions of the history taking and discussion after exam.     HPI:  87 y/o, pmhx notable for AFib (not on anticoagulant), IBS, HTN, anxiety, and hyper parathryroidism.    1)  UI:  (--) NICOLA <  (--) UUI X 2years. +increased urgency--worried may start to leak.   (--) pads:  Daytime frequency: Q < 1 hours. Tried VESIcare --helped years ago.  When restarted recently, did not help. Oxybutynin did not help.  Nocturia: Yes: 4/night.   (--) dysuria,  (--) hematuria,  (--) frequent UTIs.  Sometimes has some incomplete bladder emptying, ryan in the middle of the night.    2)  POP:  Feels bulge sometimes.   (--) vaginal bleeding. (--) vaginal discharge. (--) sexually active.  (--) dyspareunia.   (--)  Vaginal dryness.  (--) vaginal estrogen use.   --POP-Q:  Aa 0; Ba 0; C +2; Ap 0; Bp 0; D -5.  Genital hiatus 3, perineal body 2. total vaginal length 8-9.  --The patient was fit with #3 ring + support pessary.  She was able to tolerate the device comfortabley with bending, squatting, valsalva.  She was not able to demonstrate independent removal and placement.  She tolerated the procedure well.    --PVR 20 mL    3)  BM:  (+) constipation/straining (uses suppositories, irregular, on regular lomotil) will sometimes go 1 week without BM).  (+) chronic diarrhea (q daily). (--) hematochezia.  (--) fecal incontinence.  (--) fecal smearing/urgency.  (+) complete evacuation.      Past Medical History  Past Medical History:   Diagnosis Date    AF (paroxysmal atrial fibrillation) 6/22/2017    Anemia     Anxiety     Arthritis     Cataract     Disorder of kidney and ureter     Encounter for blood transfusion     Hypertension     OAB (overactive bladder) 8/22/2017    Osteoporosis      Polyuria     Thyroid disease         Past Surgical History  Past Surgical History:   Procedure Laterality Date    ADENOIDECTOMY      CATARACT EXTRACTION      HYSTERECTOMY N/A     KNEE SURGERY      TONSILLECTOMY        Hysterectomy: Yes  Date: .  Indication: Fibroids.    Type: xlap/JOSE  Cervix present: Yes   Ovaries present: Yes   Other procedures at time of hysterectomy:  Appendectomy    Past Ob History     x  Yes    Largest infant weight:  8#9  unknown FAVD. unknown episiotomy.      Gynecologic History  LMP: No LMP recorded. Patient has had a hysterectomy.  Age of menarche: 11  Age of menopause: Early 50s  Menstrual history: Regular  Pap test: 20 years ago.  History of abnormal paps: No.  History of STIs:  No  Mammogram: Date of last: 3/2016.  Result: Normal  Colonoscopy: Date of last: 2016.  Result:  Normal.  Repeat due:  N/A.    DEXA:  Date of last:  Unknown, reports history of osteoperosis (hx of colchisine usage)    Issues include:  Patient Active Problem List   Diagnosis    Chronic midline low back pain without sciatica    Hypertension, well controlled    Irritable bowel syndrome with diarrhea    AF (paroxysmal atrial fibrillation)    Primary hyperparathyroidism    Hypercalcemia    Generalized OA    Anxiety    OAB (overactive bladder)    Urinary urgency    Increased frequency of urination    Chronic constipation    Cervical prolapse    Rectocele, female    Midline cystocele    Nocturia    Atrophic vaginitis    Urinary incontinence, mixed    Pelvic floor weakness       History since last visit:     1)  Urinary urgency/frequency:    --urine C&S  NEG  --Daytime frequency: Q < 1 hours.  If holds, feels pain in bladder area.     --has been attending PT--doesn't think this is helping much; can't quantify U/F.  Feels that doing exercises makes her have increased bladder pressure/U/F.    --is only drinking 3 glasses water/day  --making sure you always have a nearby  bathroom  --kept voiding diary x 3 days in 7/17:  1st 2 days (3 glasses of water x 8 oz) had freq Qh; last day had freq Q2-3h (4 x 8 oz of water)  --has been using vaginal estrogen 2x/week  **Patient was able to hold bladder >1.5 hour w/o U/F issues during visit 1/18.      2) Constipation:  --taking metamucil (1 T) daily  --has good and bad days  --is having some gas; uses gas X PRN    3)  Nocturia (nighttime urination):   --baseline: 4/night.   --was 2x/PM x a few nights  --stops fluids 2 hours before bed/no water by bed  --does not have leg and swelling     4)  Stage 3 cervical prolapse, stage 2 cystocele/rectocele:  --wearing #3 ring + support  --feels is helping bulge   --no issues: no VB, pain, discharge  --had 1 episode of min blood on pad--let PCP know, but didn't happen again      Medications:    Current Outpatient Prescriptions:     ACETAMINOPHEN (TYLENOL ORAL), Take by mouth 2 (two) times daily as needed. , Disp: , Rfl:     amLODIPine (NORVASC) 5 MG tablet, Take 1 tablet (5 mg total) by mouth once daily., Disp: 90 tablet, Rfl: 2    diphenoxylate-atropine 2.5-0.025 mg (LOMOTIL) 2.5-0.025 mg per tablet, Take 1 tablet by mouth 4 (four) times daily as needed for Diarrhea., Disp: , Rfl:     estradiol (ESTRACE) 0.01 % (0.1 mg/gram) vaginal cream, Place 0.5 g vaginally twice a week., Disp: 42.5 g, Rfl: 11    glycerin adult suppository, Place 1 suppository rectally as needed for Constipation., Disp: , Rfl:     losartan-hydrochlorothiazide 50-12.5 mg (HYZAAR) 50-12.5 mg per tablet, TAKE 1 TABLET BY MOUTH DAILY. For high blood pressure, Disp: 90 tablet, Rfl: 3    metoprolol succinate (TOPROL-XL) 25 MG 24 hr tablet, Take 1 tablet (25 mg total) by mouth once daily. For high blood pressure and to slow the heart down, Disp: 90 tablet, Rfl: 3    sertraline (ZOLOFT) 100 MG tablet, Take 1 tablet (100 mg total) by mouth once daily. For anxiety, Disp: 90 tablet, Rfl: 3    aspirin 325 MG tablet, Take 1 tablet (325 mg  "total) by mouth once daily., Disp: , Rfl: 0    methjanae-bella.blue-s.phos-phsal-hyo 118-10-40.8-36 mg Cap, Take 1 capsule by mouth 4 (four) times daily as needed., Disp: 20 capsule, Rfl: 6    ROS:  As per HPI.      Exam  BP (!) 146/68 (BP Location: Right arm, Patient Position: Sitting)   Ht 5' 3" (1.6 m)   Wt 63.8 kg (140 lb 10.5 oz)   BMI 24.92 kg/m²   General: alert and oriented, no acute distress  Abd: soft, non-tender, non-distended    Pelvic  Ext. Genitalia: normal external genitalia. Normal bartholin's and skenes glands  Vagina: #3 ring + support pessary in place.  + atrophy. Normal vaginal mucosa without lesions. No discharge noted.   Non-tender bladder base without palpable mass.  Cervix: no lesions  Uterus:  uterus is absent   Urethra: no masses or tenderness  Urethral meatus: no lesions, caruncle or prolapse.  #3 ring + support pessary cleaned and replaced without issue.     **Patient was able to hold bladder >1.5 hour w/o U/F issues during visit today.     Impression  1. Increased frequency of urination  rosas-bella.blue-s.phos-phsal-hyo 118-10-40.8-36 mg Cap    Urine culture   2. Anxiety     3. Atrophic vaginitis     4. Midline cystocele     5. Nocturia     6. OAB (overactive bladder)     7. Pelvic floor weakness     8. Urinary incontinence, mixed     9. Urinary urgency     10. Chronic constipation     11. Irritable bowel syndrome with diarrhea     12. Rectocele, female       We reviewed the above issues and discussed options for short-term versus long-term management of her problems.   Plan:   1)  Urinary urgency/frequency:    --increase hydration to at least 6 x 8 oz of water a day   --work on timed voids: SEE SHEET and COMPLETE.   --for flares: try uribel up to 4x/day; may make urine blue/green  --make sure you always have a nearby bathroom  --Empty bladder every 3 hours.  Empty well: wait a minute, lean forward on toilet.    --Avoid dietary irritants (see sheet).  Keep diary x 3-5 days to determine your " irritants.  --For dry mouth: get sour, sugar free lozenge or gum.    --continue pelvic floor PT exercises daily  --URGE: consider medication in future.  Takes 2-4 weeks to see if will have effect.  For dry mouth: get sour, sugar free lozenge or gum.  Hx of Vesicare, Oxybutynin (lack of results)    2) Constipation with increased gas:  --continue fiber 1 Tablespoon a day  --for episodes of constipation:  Take1-2 stool softeners a day as needed (rx sent to [facility] but may need to get over the counter)  --controlling bowel movements may help with bladder issues  --look at gas sheet and work on things to reduce gas    3)  Nocturia (nighttime urination): stop fluids 2 hours before bed/no water by bed.  If have leg swelling:  Elevate feet above chest x 1 hour before bed to get excess fluid off.  Can also use support hose (knee highs).      4)  Stage 3 cervical prolapse, stage 2 cystocele/rectocele:  --trial of pessary: patient fit with #3 ring + support.  Pessary order form completed, and will restock [facility] with incoming pessary.   --RTC every 3 months for checks    5)   Vaginal atrophy (dryness):  Use dime-sized amount of estrogen cream in vagina and around opening twice a week.    Apply internally to knuckle and around vaginal opening.      6)   RTC 3 months. With NP for pessary check.  Held bladder 1 hour today before urgency appreciated.     45 minutes were spent in face to face time with this patient  90 % of this time was spent in counseling and/or coordination of care  ME@  [facility]  Division of Female Pelvic Medicine and Reconstructive Surgery  Department of Obstetrics & Gynecology

## 2018-07-26 NOTE — LETTER
July 30, 2018      Chun Cardoso MD  [address] [place] [address]           [facility] [address] [place] [address]  Phone: [phone]          Patient: Ana Johnston   MR Number: [id number]   YOB: 1930   Date of Visit: 7/26/2018       Dear Dr. Chun Cardoso:    Thank you for referring Ana Johnston to me for evaluation. Attached you will find relevant portions of my assessment and plan of care.    If you have questions, please do not hesitate to call me. I look forward to following Ana Johnston along with you.    Sincerely,    Donna Guillen MD    Enclosure  CC:  No Recipients    If you would like to receive this communication electronically, please contact [email or web address] or [phone] to request more information on [employer] access.    For providers and/or their staff who would like to refer a patient to [facility], please contact us through our one-stop-shop provider referral line, [facility] [place] operated [facility], at [phone].    If you feel you have received this communication in error or would no longer like to receive these types of communications, please e-mail [email or web address]

## 2018-07-27 LAB — BACTERIA UR CULT: NO GROWTH

## 2018-07-30 ENCOUNTER — TELEPHONE (OUTPATIENT)
Dept: UROGYNECOLOGY | Facility: CLINIC | Age: 83
End: 2018-07-30

## 2018-07-30 NOTE — TELEPHONE ENCOUNTER
----- Message from Donna Guillen MD sent at 7/29/2018  6:12 PM CDT -----  Please let the patient know urine C&S was negative for infection.  Thanks!

## 2018-07-30 NOTE — TELEPHONE ENCOUNTER
Relayed message to pt's son her urine C&S was negative for infection. Pt's son voiced understanding and call ended.

## 2018-09-25 ENCOUNTER — OFFICE VISIT (OUTPATIENT)
Dept: FAMILY MEDICINE | Facility: CLINIC | Age: 83
End: 2018-09-25
Payer: MEDICARE

## 2018-09-25 ENCOUNTER — LAB VISIT (OUTPATIENT)
Dept: LAB | Facility: HOSPITAL | Age: 83
End: 2018-09-25
Attending: FAMILY MEDICINE
Payer: MEDICARE

## 2018-09-25 VITALS
WEIGHT: 143.94 LBS | HEIGHT: 63 IN | SYSTOLIC BLOOD PRESSURE: 146 MMHG | OXYGEN SATURATION: 97 % | RESPIRATION RATE: 16 BRPM | BODY MASS INDEX: 25.5 KG/M2 | DIASTOLIC BLOOD PRESSURE: 86 MMHG | TEMPERATURE: 99 F | HEART RATE: 97 BPM

## 2018-09-25 DIAGNOSIS — E21.0 PRIMARY HYPERPARATHYROIDISM: ICD-10-CM

## 2018-09-25 DIAGNOSIS — E83.52 HYPERCALCEMIA: ICD-10-CM

## 2018-09-25 DIAGNOSIS — I10 HYPERTENSION, WELL CONTROLLED: ICD-10-CM

## 2018-09-25 DIAGNOSIS — K58.0 IRRITABLE BOWEL SYNDROME WITH DIARRHEA: Primary | ICD-10-CM

## 2018-09-25 LAB
ALBUMIN SERPL BCP-MCNC: 3.8 G/DL
ALP SERPL-CCNC: 62 U/L
ALT SERPL W/O P-5'-P-CCNC: 12 U/L
ANION GAP SERPL CALC-SCNC: 10 MMOL/L
AST SERPL-CCNC: 18 U/L
BILIRUB SERPL-MCNC: 0.5 MG/DL
BUN SERPL-MCNC: 25 MG/DL
CA-I BLDV-SCNC: 1.28 MMOL/L
CALCIUM SERPL-MCNC: 10.3 MG/DL
CHLORIDE SERPL-SCNC: 100 MMOL/L
CO2 SERPL-SCNC: 31 MMOL/L
CREAT SERPL-MCNC: 0.9 MG/DL
EST. GFR  (AFRICAN AMERICAN): >60 ML/MIN/1.73 M^2
EST. GFR  (NON AFRICAN AMERICAN): 57.3 ML/MIN/1.73 M^2
GLUCOSE SERPL-MCNC: 69 MG/DL
POTASSIUM SERPL-SCNC: 4.7 MMOL/L
PROT SERPL-MCNC: 6.8 G/DL
PTH-INTACT SERPL-MCNC: 74 PG/ML
SODIUM SERPL-SCNC: 141 MMOL/L

## 2018-09-25 PROCEDURE — 80053 COMPREHEN METABOLIC PANEL: CPT

## 2018-09-25 PROCEDURE — 83970 ASSAY OF PARATHORMONE: CPT

## 2018-09-25 PROCEDURE — 99213 OFFICE O/P EST LOW 20 MIN: CPT | Mod: PBBFAC,PO | Performed by: FAMILY MEDICINE

## 2018-09-25 PROCEDURE — 36415 COLL VENOUS BLD VENIPUNCTURE: CPT | Mod: PO

## 2018-09-25 PROCEDURE — 99999 PR PBB SHADOW E&M-EST. PATIENT-LVL III: CPT | Mod: PBBFAC,,, | Performed by: FAMILY MEDICINE

## 2018-09-25 PROCEDURE — 99214 OFFICE O/P EST MOD 30 MIN: CPT | Mod: S$PBB,,, | Performed by: FAMILY MEDICINE

## 2018-09-25 PROCEDURE — 82330 ASSAY OF CALCIUM: CPT

## 2018-09-25 NOTE — PROGRESS NOTES
Health Maintenance             Influenza Vaccine:  Pt will call back when ready to schedule

## 2018-09-25 NOTE — PROGRESS NOTES
Subjective:       Patient ID: Ana Johnston is a 88 y.o. female.    Chief Complaint: Hypertension (3 months follow up ); Hypercalcemia (3 months follow up); and Hyperparathyroidism (3 months follow up )    HPI    Htn - Pt is doing well on her current medications. She is adherent with her medications and is doing well.     Hypercalcemia - Pt is due for repeat. No constipation or mood changes.     Hyperparathyroidsim - Pt is due for repeat. No constipation or mood changes.     IBS - pts sxs are about the same as they have been for months. She has increased gas, but this is typical for her.         Current Outpatient Medications on File Prior to Visit   Medication Sig Dispense Refill    ACETAMINOPHEN (TYLENOL ORAL) Take by mouth 2 (two) times daily as needed.       amLODIPine (NORVASC) 5 MG tablet Take 1 tablet (5 mg total) by mouth once daily. 90 tablet 2    aspirin 325 MG tablet Take 1 tablet (325 mg total) by mouth once daily.  0    estradiol (ESTRACE) 0.01 % (0.1 mg/gram) vaginal cream Place 0.5 g vaginally twice a week. 42.5 g 11    losartan-hydrochlorothiazide 50-12.5 mg (HYZAAR) 50-12.5 mg per tablet TAKE 1 TABLET BY MOUTH DAILY. For high blood pressure 90 tablet 3    metoprolol succinate (TOPROL-XL) 25 MG 24 hr tablet Take 1 tablet (25 mg total) by mouth once daily. For high blood pressure and to slow the heart down 90 tablet 3    sertraline (ZOLOFT) 100 MG tablet Take 1 tablet (100 mg total) by mouth once daily. For anxiety 90 tablet 3    diphenoxylate-atropine 2.5-0.025 mg (LOMOTIL) 2.5-0.025 mg per tablet Take 1 tablet by mouth 4 (four) times daily as needed for Diarrhea.      glycerin adult suppository Place 1 suppository rectally as needed for Constipation.      methen-m.blue-s.phos-phsal-hyo 118-10-40.8-36 mg Cap Take 1 capsule by mouth 4 (four) times daily as needed. 20 capsule 6     No current facility-administered medications on file prior to visit.        Past Medical History:   Diagnosis Date     AF (paroxysmal atrial fibrillation) 6/22/2017    Anemia     Anxiety     Arthritis     Cataract     Disorder of kidney and ureter     Encounter for blood transfusion     Hypertension     OAB (overactive bladder) 8/22/2017    Osteoporosis     Polyuria     Thyroid disease        Family History   Problem Relation Age of Onset    Arthritis Mother     Thyroid disease Mother     Heart attack Father     Diabetes Father     Liver disease Father     Arthritis Sister     Osteoporosis Sister     Hypertension Sister     Early death Brother     Asthma Son     Tuberculosis Maternal Grandmother     Early death Sister     Thyroid disease Other     Cervical cancer Neg Hx     Endometrial cancer Neg Hx     Vaginal cancer Neg Hx         reports that she has quit smoking. she has never used smokeless tobacco. She reports that she drinks about 1.8 oz of alcohol per week. She reports that she does not use drugs.    Review of Systems   Cardiovascular: Negative for chest pain and palpitations.   Gastrointestinal: Negative for diarrhea and vomiting.       Objective:     Vitals:    09/25/18 0851   BP: (!) 146/86   Pulse: 97   Resp: 16   Temp: 98.5 °F (36.9 °C)        Physical Exam   Constitutional: She appears well-developed. No distress.   HENT:   Head: Normocephalic and atraumatic.   Eyes: Conjunctivae are normal. Right eye exhibits no discharge. Left eye exhibits no discharge. No scleral icterus.   Cardiovascular: Normal rate and regular rhythm. Exam reveals no gallop and no friction rub.   Murmur (2/6) heard.  Pulmonary/Chest: Effort normal and breath sounds normal. No respiratory distress. She has no wheezes. She has no rales.   Musculoskeletal:   Mild thoracic kyphosis   Neurological: She is alert.   Skin: She is not diaphoretic.   Psychiatric: She has a normal mood and affect. Cognition and memory are impaired. She exhibits abnormal recent memory.   Vitals reviewed.      Assessment:       1. Irritable  bowel syndrome with diarrhea    2. Hypercalcemia    3. Primary hyperparathyroidism    4. Hypertension, well controlled        Plan:       Ana was seen today for hypertension, hypercalcemia and hyperparathyroidism.    Diagnoses and all orders for this visit:    Irritable bowel syndrome with diarrhea  Chronic - stable  Hypercalcemia  Will check ionized calcium today  Primary hyperparathyroidism  Will check today - asxs  Hypertension, well controlled   - Chronic - stable     Pt is doing well on current therapy. No side effects noted. Will continue current therapy.            Follow-up in about 3 months (around 12/25/2018) for htn, hyperparathyroid, IBS.        Pt verbalized understanding and agreed with our plan.

## 2018-10-09 ENCOUNTER — TELEPHONE (OUTPATIENT)
Dept: FAMILY MEDICINE | Facility: CLINIC | Age: 83
End: 2018-10-09

## 2018-10-09 NOTE — TELEPHONE ENCOUNTER
Spoke with patient. Notified of refills available at pharmacy. Verbalized understanding. Will contact pharmacy to inquire.

## 2018-10-09 NOTE — TELEPHONE ENCOUNTER
----- Message from Alberto Glasgow sent at 10/9/2018  3:00 PM CDT -----  Contact: Self/[phone]  The patient stating she's out of her medication.  Refill:amLODIPine (NORVASC) 5 MG tablet    [facility]/pharmacy #[id number] - [facility] [address] [phone] (Phone)  [phone] (Fax)    Thank you

## 2018-10-23 ENCOUNTER — TELEPHONE (OUTPATIENT)
Dept: UROGYNECOLOGY | Facility: CLINIC | Age: 83
End: 2018-10-23

## 2018-10-23 NOTE — TELEPHONE ENCOUNTER
Called pt no answer, Left voice message for pt to give the office a call back at [phone] to reschedule appointment on 11/7/18 to 11/8/18.

## 2018-11-08 ENCOUNTER — OFFICE VISIT (OUTPATIENT)
Dept: UROGYNECOLOGY | Facility: CLINIC | Age: 83
End: 2018-11-08
Payer: MEDICARE

## 2018-11-08 VITALS
WEIGHT: 144.38 LBS | DIASTOLIC BLOOD PRESSURE: 80 MMHG | HEIGHT: 63 IN | SYSTOLIC BLOOD PRESSURE: 120 MMHG | BODY MASS INDEX: 25.58 KG/M2

## 2018-11-08 DIAGNOSIS — N99.3 VAGINAL VAULT PROLAPSE AFTER HYSTERECTOMY: ICD-10-CM

## 2018-11-08 DIAGNOSIS — R35.0 URINARY FREQUENCY: ICD-10-CM

## 2018-11-08 DIAGNOSIS — K59.00 CONSTIPATION, UNSPECIFIED CONSTIPATION TYPE: ICD-10-CM

## 2018-11-08 DIAGNOSIS — N90.89 VULVAR IRRITATION: Primary | ICD-10-CM

## 2018-11-08 DIAGNOSIS — N81.11 MIDLINE CYSTOCELE: ICD-10-CM

## 2018-11-08 DIAGNOSIS — Z46.89 PESSARY MAINTENANCE: ICD-10-CM

## 2018-11-08 DIAGNOSIS — R39.15 URINARY URGENCY: ICD-10-CM

## 2018-11-08 PROCEDURE — 99999 PR PBB SHADOW E&M-EST. PATIENT-LVL III: CPT | Mod: PBBFAC,,, | Performed by: NURSE PRACTITIONER

## 2018-11-08 PROCEDURE — 99214 OFFICE O/P EST MOD 30 MIN: CPT | Mod: S$PBB,,, | Performed by: NURSE PRACTITIONER

## 2018-11-08 PROCEDURE — 99213 OFFICE O/P EST LOW 20 MIN: CPT | Mod: PBBFAC | Performed by: NURSE PRACTITIONER

## 2018-11-08 RX ORDER — CLOBETASOL PROPIONATE 0.5 MG/G
OINTMENT TOPICAL 2 TIMES DAILY
Qty: 30 G | Refills: 2 | Status: SHIPPED | OUTPATIENT
Start: 2018-11-08 | End: 2019-04-26

## 2018-11-08 NOTE — PATIENT INSTRUCTIONS
1)  Urinary urgency/frequency:    --increase hydration to at least 6 x 8 oz of water a day   --work on timed voids: SEE SHEET and COMPLETE.   --for flares: try uribel up to 4x/day; may make urine blue/green  --make sure you always have a nearby bathroom  --Empty bladder every 3 hours.  Empty well: wait a minute, lean forward on toilet.    --Avoid dietary irritants (see sheet).  Keep diary x 3-5 days to determine your irritants.  --For dry mouth: get sour, sugar free lozenge or gum.    --continue pelvic floor PT exercises daily  --URGE: consider medication in future.  Takes 2-4 weeks to see if will have effect.  For dry mouth: get sour, sugar free lozenge or gum.  Hx of Vesicare, Oxybutynin (lack of results)     2) Constipation with increased gas:  --continue fiber 1 Tablespoon a day  --for episodes of constipation:  Take1-2 stool softeners a day as needed (rx sent to [facility] but may need to get over the counter)  --controlling bowel movements may help with bladder issues  --look at gas sheet and work on things to reduce gas     3)  Nocturia (nighttime urination): stop fluids 2 hours before bed/no water by bed.  If have leg swelling:  Elevate feet above chest x 1 hour before bed to get excess fluid off.  Can also use support hose (knee highs).       4)  Stage 3 cervical prolapse, stage 2 cystocele/rectocele:  --continue  #3 ring + support.        5)   Vaginal atrophy (dryness):  Use dime-sized amount of estrogen cream in vagina and around opening twice a week.    Apply internally to knuckle and around vaginal opening.       6)   vulvar irritation  --clobetasol ointment to vulva twice daily x 10 days  --put moisture barrier on daily    7)RTC 3 months for pc

## 2018-11-08 NOTE — PROGRESS NOTES
Urogyn follow up  11/08/2018  .  [facility] [address] [place] [address]    Ana Johnston  [id number]  9/21/1930      Ana Johnston is a 88 y.o.  here for a urogyn follow up of urinary urgency and urinary frequency.    Last HPI from 07/26/2018  1)  Urinary urgency/frequency:    --urine C&S 9/17 NEG  --Daytime frequency: Q < 1 hours.  If holds, feels pain in bladder area.     --has been attending PT--doesn't think this is helping much; can't quantify U/F.  Feels that doing exercises makes her have increased bladder pressure/U/F.    --is only drinking 3 glasses water/day  --making sure you always have a nearby bathroom  --kept voiding diary x 3 days in 7/17:  1st 2 days (3 glasses of water x 8 oz) had freq Qh; last day had freq Q2-3h (4 x 8 oz of water)  --has been using vaginal estrogen 2x/week  **Patient was able to hold bladder >1.5 hour w/o U/F issues during visit 1/18.       2) Constipation:  --taking metamucil (1 T) daily  --has good and bad days  --is having some gas; uses gas X PRN     3)  Nocturia (nighttime urination):   --baseline: 4/night.   --was 2x/PM x a few nights  --stops fluids 2 hours before bed/no water by bed  --does not have leg and swelling      4)  Stage 3 cervical prolapse, stage 2 cystocele/rectocele:  --wearing #3 ring + support  --feels is helping bulge   --no issues: no VB, pain, discharge  --had 1 episode of min blood on pad--let PCP know, but didn't happen again       Changes from last visit:  1)  Urinary urgency/frequency:    --urine C&S 07/2018 NEG  --Daytime frequency: Q < 1 hours.  If holds, feels pain in bladder area.     --has been attending PT--doesn't think this is helping much; can't quantify U/F.  Feels that doing exercises makes her have increased bladder pressure/U/F.    --is only drinking 3 glasses water/day  --making sure you always have a nearby bathroom  --kept voiding diary x 3 days in 7/17:  1st 2 days (3 glasses of water x 8 oz)  had freq Qh; last day had freq Q2-3h (4 x 8 oz of water)  --has been using vaginal estrogen 2x/week  **Patient was able to hold bladder >1.5 hour w/o U/F issues during visit 1/18.       2) Constipation:  --taking metamucil (1 T) daily  --has good and bad days  --is having some gas; uses gas X PRN     3)  Nocturia (nighttime urination):   --baseline: 4/night.   --now 2-4x/PM   --stops fluids 2 hours before bed/no water by bed  --does not have leg and swelling      4)  Stage 3 cervical prolapse, stage 2 cystocele/rectocele:  --wearing #3 ring + support  --feels is helping bulge   --no issues: no VB, pain, discharge  --scant pink discharge    Past Medical History:   Diagnosis Date    AF (paroxysmal atrial fibrillation) 6/22/2017    Anemia     Anxiety     Arthritis     Cataract     Disorder of kidney and ureter     Encounter for blood transfusion     Hypertension     OAB (overactive bladder) 8/22/2017    Osteoporosis     Polyuria     Thyroid disease        Past Surgical History:   Procedure Laterality Date    ADENOIDECTOMY      CATARACT EXTRACTION      HYSTERECTOMY N/A 1965    KNEE SURGERY      TONSILLECTOMY         Current Outpatient Medications   Medication Sig    ACETAMINOPHEN (TYLENOL ORAL) Take by mouth 2 (two) times daily as needed.     amLODIPine (NORVASC) 5 MG tablet Take 1 tablet (5 mg total) by mouth once daily.    aspirin 325 MG tablet Take 1 tablet (325 mg total) by mouth once daily.    estradiol (ESTRACE) 0.01 % (0.1 mg/gram) vaginal cream Place 0.5 g vaginally twice a week.    losartan-hydrochlorothiazide 50-12.5 mg (HYZAAR) 50-12.5 mg per tablet TAKE 1 TABLET BY MOUTH DAILY. For high blood pressure    sertraline (ZOLOFT) 100 MG tablet Take 1 tablet (100 mg total) by mouth once daily. For anxiety    clobetasol 0.05% (TEMOVATE) 0.05 % Oint Apply topically 2 (two) times daily. for 14 days    diphenoxylate-atropine 2.5-0.025 mg (LOMOTIL) 2.5-0.025 mg per tablet Take 1 tablet by mouth  "4 (four) times daily as needed for Diarrhea.    glycerin adult suppository Place 1 suppository rectally as needed for Constipation.    metoprolol succinate (TOPROL-XL) 25 MG 24 hr tablet Take 1 tablet (25 mg total) by mouth once daily. For high blood pressure and to slow the heart down     No current facility-administered medications for this visit.        Well woman:  Pap test: 20 years ago.  History of abnormal paps: No.  History of STIs:  No  Mammogram: Date of last: 3/2016.  Result: Normal  Colonoscopy: Date of last: 6/2016.  Result:  Normal.  Repeat due:  N/A.    DEXA:  Date of last:  Unknown, reports history of osteoperosis (hx of colchisine usage)    ROS:  As per HPI.      Exam  /80 (BP Location: Right arm, Patient Position: Sitting, BP Method: Medium (Manual))   Ht 5' 3" (1.6 m)   Wt 65.5 kg (144 lb 6.4 oz)   BMI 25.58 kg/m²   General: alert and oriented, no acute distress  Respiratory: normal respiratory effort  Abd: soft, non-tender, non-distended    Pelvic  Ext. Genitalia: normal external genitalia. Normal bartholin's and skeens glands  Vagina: + atrophy. Normal vaginal mucosa without lesions. No discharge noted.   Non-tender bladder base without palpable mass. #3 ring with support in place  Cervix: no cervical motion tenderness and no lesions  Uterus:  absent   Urethra: no masses or tenderness  Urethral meatus: no lesions, caruncle or prolapse.    Pessary removed without difficulty.  Washed with soap and water.  Reinserted without difficulty.    Impression  1. Vulvar irritation  clobetasol 0.05% (TEMOVATE) 0.05 % Oint   2. Urinary urgency     3. Urinary frequency     4. Vaginal vault prolapse after hysterectomy     5. Midline cystocele     6. Constipation, unspecified constipation type     7. Pessary maintenance       We reviewed the above issues and discussed options for short-term versus long-term management of her problems.   Plan:   1)  Urinary urgency/frequency:    --increase hydration to " at least 6 x 8 oz of water a day   --work on timed voids: SEE SHEET and COMPLETE.   --for flares: try uribel up to 4x/day; may make urine blue/green  --make sure you always have a nearby bathroom  --Empty bladder every 3 hours.  Empty well: wait a minute, lean forward on toilet.    --Avoid dietary irritants (see sheet).  Keep diary x 3-5 days to determine your irritants.  --For dry mouth: get sour, sugar free lozenge or gum.    --continue pelvic floor PT exercises daily  --URGE: consider medication in future.  Takes 2-4 weeks to see if will have effect.  For dry mouth: get sour, sugar free lozenge or gum.  Hx of Vesicare, Oxybutynin (lack of results)     2) Constipation with increased gas:  --continue fiber 1 Tablespoon a day  --for episodes of constipation:  Take1-2 stool softeners a day as needed (rx sent to [facility] but may need to get over the counter)  --controlling bowel movements may help with bladder issues  --look at gas sheet and work on things to reduce gas     3)  Nocturia (nighttime urination): stop fluids 2 hours before bed/no water by bed.  If have leg swelling:  Elevate feet above chest x 1 hour before bed to get excess fluid off.  Can also use support hose (knee highs).       4)  Stage 3 cervical prolapse, stage 2 cystocele/rectocele:  --continue  #3 ring + support.        5)   Vaginal atrophy (dryness):  Use dime-sized amount of estrogen cream in vagina and around opening twice a week.    Apply internally to knuckle and around vaginal opening.       6)   vulvar irritation  --clobetasol ointment to vulva twice daily x 10 days  --put moisture barrier on daily    7)RTC 3 months for pc      30 minutes were spent in face to face time with this patient  90 % of this time was spent in counseling and/or coordination of care    GEMMA Gonzalez-BC [facility]  Division of Female Pelvic Medicine and Reconstructive Surgery  Department of Obstetrics & Gynecology

## 2019-01-14 ENCOUNTER — OFFICE VISIT (OUTPATIENT)
Dept: FAMILY MEDICINE | Facility: CLINIC | Age: 84
End: 2019-01-14
Payer: MEDICARE

## 2019-01-14 VITALS
OXYGEN SATURATION: 97 % | HEIGHT: 63 IN | DIASTOLIC BLOOD PRESSURE: 80 MMHG | SYSTOLIC BLOOD PRESSURE: 126 MMHG | HEART RATE: 77 BPM | RESPIRATION RATE: 20 BRPM | WEIGHT: 147.94 LBS | BODY MASS INDEX: 26.21 KG/M2 | TEMPERATURE: 98 F

## 2019-01-14 DIAGNOSIS — F41.9 ANXIETY: Primary | ICD-10-CM

## 2019-01-14 DIAGNOSIS — Z23 NEED FOR INFLUENZA VACCINATION: ICD-10-CM

## 2019-01-14 DIAGNOSIS — K59.09 CHRONIC CONSTIPATION: ICD-10-CM

## 2019-01-14 DIAGNOSIS — I48.0 AF (PAROXYSMAL ATRIAL FIBRILLATION): ICD-10-CM

## 2019-01-14 DIAGNOSIS — N32.81 OAB (OVERACTIVE BLADDER): ICD-10-CM

## 2019-01-14 DIAGNOSIS — K58.0 IRRITABLE BOWEL SYNDROME WITH DIARRHEA: ICD-10-CM

## 2019-01-14 PROCEDURE — 99214 OFFICE O/P EST MOD 30 MIN: CPT | Mod: S$PBB,,, | Performed by: FAMILY MEDICINE

## 2019-01-14 PROCEDURE — 99213 OFFICE O/P EST LOW 20 MIN: CPT | Mod: PBBFAC,PO | Performed by: FAMILY MEDICINE

## 2019-01-14 PROCEDURE — 99999 PR PBB SHADOW E&M-EST. PATIENT-LVL III: CPT | Mod: PBBFAC,,, | Performed by: FAMILY MEDICINE

## 2019-01-14 PROCEDURE — 99999 PR PBB SHADOW E&M-EST. PATIENT-LVL III: ICD-10-PCS | Mod: PBBFAC,,, | Performed by: FAMILY MEDICINE

## 2019-01-14 PROCEDURE — 90662 IIV NO PRSV INCREASED AG IM: CPT | Mod: PBBFAC,PO

## 2019-01-14 PROCEDURE — 99214 PR OFFICE/OUTPT VISIT, EST, LEVL IV, 30-39 MIN: ICD-10-PCS | Mod: S$PBB,,, | Performed by: FAMILY MEDICINE

## 2019-01-14 NOTE — PROGRESS NOTES
Subjective:       Patient ID: Ana Johnston is a 88 y.o. female.    Chief Complaint: Hypertension (3 months follow up ); Hyperparathyroidism (3 months follow up ); and Irritable Bowel Syndrome (3 months follow up )    HPI    IBS - chronic - stable. Pt has not been taking lomotil and has not had diarrhea in many months!    Urinary frequency  -  Notably worse over the last motnhs. She has a f/u to see Dr Solo in 2 weeks.     Anxiety - pt states that she is on zoloft is helping and her son agrees without side effect.     htn - pt is adherent with her medications without side effects.     A Fib- chronic - stable on aspirin and metop 25mg.       Current Outpatient Medications on File Prior to Visit   Medication Sig Dispense Refill    ACETAMINOPHEN (TYLENOL ORAL) Take by mouth 2 (two) times daily as needed.       amLODIPine (NORVASC) 5 MG tablet Take 1 tablet (5 mg total) by mouth once daily. 90 tablet 2    aspirin 325 MG tablet Take 1 tablet (325 mg total) by mouth once daily.  0    clobetasol 0.05% (TEMOVATE) 0.05 % Oint Apply topically 2 (two) times daily. for 14 days 30 g 2    estradiol (ESTRACE) 0.01 % (0.1 mg/gram) vaginal cream Place 0.5 g vaginally twice a week. 42.5 g 11    glycerin adult suppository Place 1 suppository rectally as needed for Constipation.      losartan-hydrochlorothiazide 50-12.5 mg (HYZAAR) 50-12.5 mg per tablet TAKE 1 TABLET BY MOUTH DAILY. For high blood pressure 90 tablet 3    metoprolol succinate (TOPROL-XL) 25 MG 24 hr tablet Take 1 tablet (25 mg total) by mouth once daily. For high blood pressure and to slow the heart down 90 tablet 3    sertraline (ZOLOFT) 100 MG tablet Take 1 tablet (100 mg total) by mouth once daily. For anxiety 90 tablet 3    [DISCONTINUED] diphenoxylate-atropine 2.5-0.025 mg (LOMOTIL) 2.5-0.025 mg per tablet Take 1 tablet by mouth 4 (four) times daily as needed for Diarrhea.       No current facility-administered medications on file prior to visit.         Past Medical History:   Diagnosis Date    AF (paroxysmal atrial fibrillation) 6/22/2017    Anemia     Anxiety     Arthritis     Cataract     Disorder of kidney and ureter     Encounter for blood transfusion     Hypertension     OAB (overactive bladder) 8/22/2017    Osteoporosis     Polyuria     Thyroid disease        Family History   Problem Relation Age of Onset    Arthritis Mother     Thyroid disease Mother     Heart attack Father     Diabetes Father     Liver disease Father     Arthritis Sister     Osteoporosis Sister     Hypertension Sister     Early death Brother     Asthma Son     Tuberculosis Maternal Grandmother     Early death Sister     Thyroid disease Other     Cervical cancer Neg Hx     Endometrial cancer Neg Hx     Vaginal cancer Neg Hx         reports that she has quit smoking. she has never used smokeless tobacco. She reports that she drinks about 1.8 oz of alcohol per week. She reports that she does not use drugs.    Review of Systems   Constitutional: Negative for activity change and unexpected weight change.   HENT: Positive for hearing loss. Negative for rhinorrhea and trouble swallowing.    Eyes: Negative for discharge and visual disturbance.   Respiratory: Negative for chest tightness and wheezing.    Cardiovascular: Negative for chest pain and palpitations.   Gastrointestinal: Negative for blood in stool, constipation, diarrhea and vomiting.   Endocrine: Positive for polyuria. Negative for polydipsia.   Genitourinary: Negative for difficulty urinating, dysuria, hematuria and menstrual problem.   Musculoskeletal: Positive for joint swelling. Negative for arthralgias and neck pain.   Neurological: Positive for weakness. Negative for headaches.   Psychiatric/Behavioral: Positive for confusion. Negative for dysphoric mood.       Objective:     Vitals:    01/14/19 1447   BP: 126/80   Pulse: 77   Resp: 20   Temp: 98.3 °F (36.8 °C)        Physical Exam    Constitutional: She appears well-developed. No distress.   HENT:   Head: Normocephalic and atraumatic.   Eyes: Conjunctivae are normal. Right eye exhibits no discharge. Left eye exhibits no discharge. No scleral icterus.   Cardiovascular: Normal rate and normal heart sounds. An irregularly irregular rhythm present. Exam reveals no gallop and no friction rub.   No murmur heard.  Pulmonary/Chest: Effort normal and breath sounds normal. No respiratory distress. She has no wheezes. She has no rales.   Neurological: She is alert.   Skin: She is not diaphoretic.   Psychiatric: She has a normal mood and affect.   Vitals reviewed.      Assessment:       1. Anxiety    2. OAB (overactive bladder)    3. Irritable bowel syndrome with diarrhea    4. Chronic constipation    5. AF (paroxysmal atrial fibrillation)    6. Need for influenza vaccination        Plan:       Ana was seen today for hypertension, hyperparathyroidism and irritable bowel syndrome.    Diagnoses and all orders for this visit:        Anxiety  - Chronic - stable     Pt is doing well on current therapy. No side effects noted. Will continue current therapy.    OAB (overactive bladder)  - Chronic - stable     Pt is doing well on current therapy. No side effects noted. Will continue current therapy.    F/u with Dr Guillen    Irritable bowel syndrome with diarrhea  - Chronic - stable     Pt is doing well on current therapy. No side effects noted. Will continue current therapy.    Chronic constipation  Will add colace PRN otc. - patient has a history diarrhea with IBS so I will hold off on stronger laxatives or stool softeners at this time.    AF (paroxysmal atrial fibrillation)  - Chronic - stable     Pt is doing well on current therapy (metop and asa 325mg). No side effects noted. Will continue current therapy.    Need for influenza vaccination  -     Influenza - High Dose (65+) (PF) (IM)        Follow-up in about 3 months (around 4/14/2019) for hyperpth, a fib,  constipation.        Pt verbalized understanding and agreed with our plan.

## 2019-02-08 ENCOUNTER — OFFICE VISIT (OUTPATIENT)
Dept: UROGYNECOLOGY | Facility: CLINIC | Age: 84
End: 2019-02-08
Payer: MEDICARE

## 2019-02-08 VITALS
SYSTOLIC BLOOD PRESSURE: 120 MMHG | BODY MASS INDEX: 26.64 KG/M2 | HEIGHT: 63 IN | WEIGHT: 150.38 LBS | DIASTOLIC BLOOD PRESSURE: 80 MMHG

## 2019-02-08 DIAGNOSIS — R39.15 URINARY URGENCY: ICD-10-CM

## 2019-02-08 DIAGNOSIS — N90.89 VULVAR IRRITATION: ICD-10-CM

## 2019-02-08 DIAGNOSIS — N39.46 URINARY INCONTINENCE, MIXED: Primary | ICD-10-CM

## 2019-02-08 DIAGNOSIS — Z46.89 PESSARY MAINTENANCE: ICD-10-CM

## 2019-02-08 DIAGNOSIS — N81.11 MIDLINE CYSTOCELE: ICD-10-CM

## 2019-02-08 DIAGNOSIS — K59.00 CONSTIPATION, UNSPECIFIED CONSTIPATION TYPE: ICD-10-CM

## 2019-02-08 DIAGNOSIS — N95.2 VAGINAL ATROPHY: ICD-10-CM

## 2019-02-08 PROCEDURE — 99213 PR OFFICE/OUTPT VISIT, EST, LEVL III, 20-29 MIN: ICD-10-PCS | Mod: S$PBB,,, | Performed by: NURSE PRACTITIONER

## 2019-02-08 PROCEDURE — 99999 PR PBB SHADOW E&M-EST. PATIENT-LVL III: CPT | Mod: PBBFAC,,, | Performed by: NURSE PRACTITIONER

## 2019-02-08 PROCEDURE — 99999 PR PBB SHADOW E&M-EST. PATIENT-LVL III: ICD-10-PCS | Mod: PBBFAC,,, | Performed by: NURSE PRACTITIONER

## 2019-02-08 PROCEDURE — 99213 OFFICE O/P EST LOW 20 MIN: CPT | Mod: S$PBB,,, | Performed by: NURSE PRACTITIONER

## 2019-02-08 PROCEDURE — 99213 OFFICE O/P EST LOW 20 MIN: CPT | Mod: PBBFAC | Performed by: NURSE PRACTITIONER

## 2019-02-08 NOTE — PROGRESS NOTES
Urogyn follow up  02/08/2019  .  [facility]  [address] [address]    Ana Johnston  [id number]  9/21/1930      Ana Johnston is a 88 y.o.  here for a urogyn follow up of urinary urgency and urinary frequency.    Last HPI from 11/08/2018  1)  Urinary urgency/frequency:    --urine C&S 07/2018 NEG  --Daytime frequency: Q < 1 hours.  If holds, feels pain in bladder area.     --has been attending PT--doesn't think this is helping much; can't quantify U/F.  Feels that doing exercises makes her have increased bladder pressure/U/F.    --is only drinking 3 glasses water/day  --making sure you always have a nearby bathroom  --kept voiding diary x 3 days in 7/17:  1st 2 days (3 glasses of water x 8 oz) had freq Qh; last day had freq Q2-3h (4 x 8 oz of water)  --has been using vaginal estrogen 2x/week  **Patient was able to hold bladder >1.5 hour w/o U/F issues during visit 1/18.     2) Constipation:  --taking metamucil (1 T) daily  --has good and bad days  --is having some gas; uses gas X PRN   3)  Nocturia (nighttime urination):   --baseline: 4/night.   --now 2-4x/PM   --stops fluids 2 hours before bed/no water by bed  --does not have leg and swelling    4)  Stage 3 cervical prolapse, stage 2 cystocele/rectocele:  --wearing #3 ring + support  --feels is helping bulge   --no issues: no VB, pain, discharge  --scant pink discharge      Changes from last visit:  1)  Urinary urgency/frequency:    --Daytime frequency: Q < 1 hours.  If holds, feels pain in bladder area.     --has been attending PT--doesn't think this is helping much; can't quantify U/F.  Feels that doing exercises makes her have increased bladder pressure/U/F.    --is only drinking 3 glasses water/day  --making sure you always have a nearby bathroom  --has been using vaginal estrogen 2x/week  **Patient was able to hold bladder >1.5 hour w/o U/F issues during visit 1/18.       2) Constipation:  --taking metamucil (1  T) daily  --has good and bad days  --is having some gas; uses gas X PRN     3)  Nocturia (nighttime urination):   --baseline: 4/night.   --now 2-4x/PM   --stops fluids 2 hours before bed/no water by bed  --does not have leg and swelling      4)  Stage 3 cervical prolapse, stage 2 cystocele/rectocele:  --wearing #3 ring + support  --feels is helping bulge   --no issues: no VB, pain, discharge  --scant pink discharge    Past Medical History:   Diagnosis Date    AF (paroxysmal atrial fibrillation) 6/22/2017    Anemia     Anxiety     Arthritis     Cataract     Disorder of kidney and ureter     Encounter for blood transfusion     Hypertension     OAB (overactive bladder) 8/22/2017    Osteoporosis     Polyuria     Thyroid disease        Past Surgical History:   Procedure Laterality Date    ADENOIDECTOMY      CATARACT EXTRACTION      HYSTERECTOMY N/A 1965    KNEE SURGERY      TONSILLECTOMY         Current Outpatient Medications   Medication Sig    ACETAMINOPHEN (TYLENOL ORAL) Take by mouth 2 (two) times daily as needed.     amLODIPine (NORVASC) 5 MG tablet Take 1 tablet (5 mg total) by mouth once daily.    losartan-hydrochlorothiazide 50-12.5 mg (HYZAAR) 50-12.5 mg per tablet TAKE 1 TABLET BY MOUTH DAILY. For high blood pressure    metoprolol succinate (TOPROL-XL) 25 MG 24 hr tablet Take 1 tablet (25 mg total) by mouth once daily. For high blood pressure and to slow the heart down    aspirin 325 MG tablet Take 1 tablet (325 mg total) by mouth once daily.    clobetasol 0.05% (TEMOVATE) 0.05 % Oint Apply topically 2 (two) times daily. for 14 days    estradiol (ESTRACE) 0.01 % (0.1 mg/gram) vaginal cream Place 0.5 g vaginally twice a week.    glycerin adult suppository Place 1 suppository rectally as needed for Constipation.    sertraline (ZOLOFT) 100 MG tablet Take 1 tablet (100 mg total) by mouth once daily. For anxiety     No current facility-administered medications for this visit.        Well  "woman:  Pap test: 20 years ago.  History of abnormal paps: No.  History of STIs:  No  Mammogram: Date of last: 3/2016.  Result: Normal  Colonoscopy: Date of last: 6/2016.  Result:  Normal.  Repeat due:  N/A.    DEXA:  Date of last:  Unknown, reports history of osteoperosis (hx of colchisine usage)    ROS:  As per HPI.      Exam  /80 (BP Location: Right arm, Patient Position: Sitting, BP Method: Small (Manual))   Ht 5' 3" (1.6 m)   Wt 68.2 kg (150 lb 5.7 oz)   BMI 26.63 kg/m²   General: alert and oriented, no acute distress  Respiratory: normal respiratory effort  Abd: soft, non-tender, non-distended    Pelvic  Ext. Genitalia: normal external genitalia. Normal bartholin's and skeens glands  Vagina: + atrophy. Normal vaginal mucosa without lesions. No discharge noted.   Non-tender bladder base without palpable mass. #3 ring with support in place  Cervix: no cervical motion tenderness and no lesions  Uterus:  absent   Urethra: no masses or tenderness  Urethral meatus: no lesions, caruncle or prolapse.    Pessary removed without difficulty.  Washed with soap and water.  Reinserted without difficulty.    Impression  1. Urinary incontinence, mixed     2. Urinary urgency     3. Midline cystocele     4. Constipation, unspecified constipation type     5. Vaginal atrophy     6. Pessary maintenance     7. Vulvar irritation       We reviewed the above issues and discussed options for short-term versus long-term management of her problems.   Plan:   1)  Urinary urgency/frequency:    --increase hydration to at least 6 x 8 oz of water a day   --work on timed voids: SEE SHEET and COMPLETE.   --for flares: try uribel up to 4x/day; may make urine blue/green  --make sure you always have a nearby bathroom  --Empty bladder every 3 hours.  Empty well: wait a minute, lean forward on toilet.    --Avoid dietary irritants (see sheet).  Keep diary x 3-5 days to determine your irritants.  --For dry mouth: get sour, sugar free lozenge " or gum.    --continue pelvic floor PT exercises daily  --URGE: consider medication in future.  Takes 2-4 weeks to see if will have effect.  For dry mouth: get sour, sugar free lozenge or gum.  Hx of Vesicare, Oxybutynin (lack of results)     2) Constipation with increased gas:  --continue fiber 1 Tablespoon a day  --for episodes of constipation:  Take1-2 stool softeners a day as needed (rx sent to [facility] but may need to get over the counter)  --controlling bowel movements may help with bladder issues  --look at gas sheet and work on things to reduce gas     3)  Nocturia (nighttime urination): stop fluids 2 hours before bed/no water by bed.  If have leg swelling:  Elevate feet above chest x 1 hour before bed to get excess fluid off.  Can also use support hose (knee highs).       4)  Stage 3 cervical prolapse, stage 2 cystocele/rectocele:  --continue  #3 ring + support.        5)   Vaginal atrophy (dryness):  Use dime-sized amount of estrogen cream in vagina and around opening twice a week.    Apply internally to knuckle and around vaginal opening.       6)   vulvar irritation  --put moisture barrier on daily    7)RTC 3 months for pc      30 minutes were spent in face to face time with this patient  90 % of this time was spent in counseling and/or coordination of care    GEMMA Gonzalez-BC [facility]  Division of Female Pelvic Medicine and Reconstructive Surgery  Department of Obstetrics & Gynecology

## 2019-02-08 NOTE — PATIENT INSTRUCTIONS
1)  Urinary urgency/frequency:    --increase hydration to at least 6 x 8 oz of water a day   --work on timed voids: SEE SHEET and COMPLETE.   --for flares: try uribel up to 4x/day; may make urine blue/green  --make sure you always have a nearby bathroom  --Empty bladder every 3 hours.  Empty well: wait a minute, lean forward on toilet.    --Avoid dietary irritants (see sheet).  Keep diary x 3-5 days to determine your irritants.  --For dry mouth: get sour, sugar free lozenge or gum.    --continue pelvic floor PT exercises daily  --URGE: consider medication in future.  Takes 2-4 weeks to see if will have effect.  For dry mouth: get sour, sugar free lozenge or gum.  Hx of Vesicare, Oxybutynin (lack of results)     2) Constipation with increased gas:  --continue fiber 1 Tablespoon a day  --for episodes of constipation:  Take1-2 stool softeners a day as needed (rx sent to [facility] but may need to get over the counter)  --controlling bowel movements may help with bladder issues  --look at gas sheet and work on things to reduce gas     3)  Nocturia (nighttime urination): stop fluids 2 hours before bed/no water by bed.  If have leg swelling:  Elevate feet above chest x 1 hour before bed to get excess fluid off.  Can also use support hose (knee highs).       4)  Stage 3 cervical prolapse, stage 2 cystocele/rectocele:  --continue  #3 ring + support.        5)   Vaginal atrophy (dryness):  Use dime-sized amount of estrogen cream in vagina and around opening twice a week.    Apply internally to knuckle and around vaginal opening.       6)   vulvar irritation  --put moisture barrier on daily like desitin or A&D ointment    7)RTC 3 months for pc

## 2019-03-30 ENCOUNTER — PATIENT MESSAGE (OUTPATIENT)
Dept: FAMILY MEDICINE | Facility: CLINIC | Age: 84
End: 2019-03-30

## 2019-04-03 RX ORDER — AMLODIPINE BESYLATE 5 MG/1
TABLET ORAL
Qty: 90 TABLET | Refills: 2 | Status: SHIPPED | OUTPATIENT
Start: 2019-04-03 | End: 2019-07-30 | Stop reason: SDUPTHER

## 2019-04-04 ENCOUNTER — PATIENT MESSAGE (OUTPATIENT)
Dept: FAMILY MEDICINE | Facility: CLINIC | Age: 84
End: 2019-04-04

## 2019-04-05 NOTE — TELEPHONE ENCOUNTER
Pts son called on 4/3 and 4/4 and messages where left to have him follow up with his mom in clinic to discuss

## 2019-04-18 ENCOUNTER — PATIENT MESSAGE (OUTPATIENT)
Dept: FAMILY MEDICINE | Facility: CLINIC | Age: 84
End: 2019-04-18

## 2019-04-18 ENCOUNTER — TELEPHONE (OUTPATIENT)
Dept: FAMILY MEDICINE | Facility: CLINIC | Age: 84
End: 2019-04-18

## 2019-04-18 NOTE — TELEPHONE ENCOUNTER
Stated patient fell at assisted living, can not provide any details of the fall, does not know if patient hit head or have any other injuries, patient son stated he was notified to contact patient PCP for advise, noting patient vitals  166/117, sen 177/114, 142/75 143. Per  patient to ED for eval , patient son verbalized understanding

## 2019-04-18 NOTE — TELEPHONE ENCOUNTER
----- Message from Alberto Glasgow sent at 4/18/2019  8:00 AM CDT -----  Contact: Antolin son/[phone]  Type: Patient Call Back    Who called:Antolin    What is the request in detail: Antolin would like to speak to the staff regarding his mothers health. He stating the patient had a fall this morning and want advice on if she should be seen.    Would the patient rather a call back or a response via [facility]? Call back    Best call back number:[phone]      Thank you

## 2019-04-26 ENCOUNTER — OFFICE VISIT (OUTPATIENT)
Dept: FAMILY MEDICINE | Facility: CLINIC | Age: 84
End: 2019-04-26
Payer: MEDICARE

## 2019-04-26 VITALS
WEIGHT: 148.56 LBS | DIASTOLIC BLOOD PRESSURE: 70 MMHG | TEMPERATURE: 99 F | HEIGHT: 63 IN | BODY MASS INDEX: 26.32 KG/M2 | OXYGEN SATURATION: 96 % | SYSTOLIC BLOOD PRESSURE: 96 MMHG | HEART RATE: 74 BPM | RESPIRATION RATE: 20 BRPM

## 2019-04-26 DIAGNOSIS — K92.1 BLOOD IN THE STOOL: ICD-10-CM

## 2019-04-26 DIAGNOSIS — F43.21 GRIEVING: ICD-10-CM

## 2019-04-26 DIAGNOSIS — Z13.31 POSITIVE DEPRESSION SCREENING: ICD-10-CM

## 2019-04-26 DIAGNOSIS — G31.84 MCI (MILD COGNITIVE IMPAIRMENT): Primary | ICD-10-CM

## 2019-04-26 PROCEDURE — 99215 OFFICE O/P EST HI 40 MIN: CPT | Mod: PBBFAC,PO | Performed by: FAMILY MEDICINE

## 2019-04-26 PROCEDURE — 99999 PR PBB SHADOW E&M-EST. PATIENT-LVL V: CPT | Mod: PBBFAC,,, | Performed by: FAMILY MEDICINE

## 2019-04-26 PROCEDURE — 99999 PR PBB SHADOW E&M-EST. PATIENT-LVL V: ICD-10-PCS | Mod: PBBFAC,,, | Performed by: FAMILY MEDICINE

## 2019-04-26 PROCEDURE — 99214 PR OFFICE/OUTPT VISIT, EST, LEVL IV, 30-39 MIN: ICD-10-PCS | Mod: S$PBB,,, | Performed by: FAMILY MEDICINE

## 2019-04-26 PROCEDURE — 99214 OFFICE O/P EST MOD 30 MIN: CPT | Mod: S$PBB,,, | Performed by: FAMILY MEDICINE

## 2019-04-26 NOTE — PROGRESS NOTES
Subjective:       Patient ID: Ana Johnston is a 88 y.o. female.    Chief Complaint: Constipation (3 months follow up ); Atrial Fibrillation (3 months follow up ); and Hypertension (3 months follow up )    HPI    Blood in stool - occurred ~ 2 + months ago and then did not recur until this week. Not associated with constipation or hard stools. Blood was seen on the toilet paper only, not on the stool or in the water.     NO blood yesterday with Bm or with wiping.     No weight loss, fevers or chills.     Recent loss of her  + grieving and getting used to a new way of life. Was  60 years.     She is having difficulty with cognitive issues such as confusion as to what time things occur such a breakfast service, appointments, etc.            Current Outpatient Medications on File Prior to Visit   Medication Sig Dispense Refill    ACETAMINOPHEN (TYLENOL ORAL) Take by mouth 2 (two) times daily as needed.       amLODIPine (NORVASC) 5 MG tablet TAKE 1 TABLET BY MOUTH EVERY DAY 90 tablet 2    aspirin 325 MG tablet Take 1 tablet (325 mg total) by mouth once daily.  0    estradiol (ESTRACE) 0.01 % (0.1 mg/gram) vaginal cream Place 0.5 g vaginally twice a week. 42.5 g 11    losartan-hydrochlorothiazide 50-12.5 mg (HYZAAR) 50-12.5 mg per tablet TAKE 1 TABLET BY MOUTH DAILY. For high blood pressure 90 tablet 3    metoprolol succinate (TOPROL-XL) 25 MG 24 hr tablet Take 1 tablet (25 mg total) by mouth once daily. For high blood pressure and to slow the heart down 90 tablet 3    sertraline (ZOLOFT) 100 MG tablet Take 1 tablet (100 mg total) by mouth once daily. For anxiety 90 tablet 3    glycerin adult suppository Place 1 suppository rectally as needed for Constipation.      [DISCONTINUED] clobetasol 0.05% (TEMOVATE) 0.05 % Oint Apply topically 2 (two) times daily. for 14 days 30 g 2     No current facility-administered medications on file prior to visit.        Past Medical History:   Diagnosis Date    AF  (paroxysmal atrial fibrillation) 6/22/2017    Anemia     Anxiety     Arthritis     Cataract     Disorder of kidney and ureter     Encounter for blood transfusion     Hypertension     OAB (overactive bladder) 8/22/2017    Osteoporosis     Polyuria     Thyroid disease        Family History   Problem Relation Age of Onset    Arthritis Mother     Thyroid disease Mother     Heart attack Father     Diabetes Father     Liver disease Father     Arthritis Sister     Osteoporosis Sister     Hypertension Sister     Early death Brother     Asthma Son     Tuberculosis Maternal Grandmother     Early death Sister     Thyroid disease Other     Cervical cancer Neg Hx     Endometrial cancer Neg Hx     Vaginal cancer Neg Hx         reports that she has quit smoking. She has never used smokeless tobacco. She reports that she drinks about 1.8 oz of alcohol per week. She reports that she does not use drugs.    Review of Systems   Constitutional: Negative for activity change and unexpected weight change.   HENT: Negative for hearing loss, rhinorrhea and trouble swallowing.    Eyes: Negative for discharge and visual disturbance.   Respiratory: Negative for chest tightness and wheezing.    Cardiovascular: Negative for chest pain and palpitations.   Gastrointestinal: Positive for blood in stool. Negative for constipation, diarrhea and vomiting.   Endocrine: Negative for polydipsia and polyuria.   Genitourinary: Negative for difficulty urinating, dysuria, hematuria and menstrual problem.   Musculoskeletal: Positive for arthralgias and joint swelling. Negative for neck pain.   Neurological: Positive for weakness. Negative for headaches.   Psychiatric/Behavioral: Positive for confusion and dysphoric mood.       Objective:     Vitals:    04/26/19 0938   BP: 96/70   Pulse: 74   Resp: 20   Temp: 98.5 °F (36.9 °C)        Physical Exam   Constitutional: She appears well-developed. No distress.   Antalgic gait - walks with  rolling walker   HENT:   Head: Normocephalic and atraumatic.   Eyes: Conjunctivae are normal. No scleral icterus.   Pulmonary/Chest: Effort normal.   Neurological: She is alert.   Skin: She is not diaphoretic.   Psychiatric: She has a normal mood and affect. Her speech is normal and behavior is normal. Thought content normal. Cognition and memory are impaired. She does not express impulsivity or inappropriate judgment. She exhibits abnormal recent memory.   Vitals reviewed.      Assessment:       1. MCI (mild cognitive impairment)    2. Grieving    3. Positive depression screening    4. Blood in the stool        Plan:       Ana was seen today for constipation, atrial fibrillation and hypertension.    Diagnoses and all orders for this visit:    MCI (mild cognitive impairment)  Chronic - worse with recent addition of increased confusion. Liekly brought on by acutely stressful event --> her husbands passing. Pts son and facility will watch her carefully. May need to add seroquel at night.   Grieving  Pt is grieving appropriately the loss of her  for 60 years.     Positive depression screening  Pt is grieving appropriately the loss of her  for 60 years.     Blood in the stool  Pt and son (POA) have opted against aggressive measures such as referral to GI or colonoscopy at this time due to patient burden and risk concerns. Will watch her sxs, weight, etc and will notify me if they would like to change their approach. I agree with plan.           Follow up in about 6 weeks (around 6/7/2019) for MCI, confusion,  grieving.      Pt verbalized understanding and agreed with our plan.

## 2019-05-01 ENCOUNTER — TELEPHONE (OUTPATIENT)
Dept: FAMILY MEDICINE | Facility: CLINIC | Age: 84
End: 2019-05-01

## 2019-05-01 NOTE — TELEPHONE ENCOUNTER
Forms that was drop off at last visit need last eye exam information. Left message for patient son, Antolin, to return call. If no, need to come in to get eye check. Do not need office visit per Dr Lemus. Need information to put onto forms

## 2019-05-07 NOTE — TELEPHONE ENCOUNTER
Notified patient son, Antolin, of information below.Antolin will call back with information of when patient are able to come in to get eye check from eye chart at clinic to document on patient forms. Do not need office visit per PCP. Pt son understand that patient may have to wait in lobby during time of visit to get eye check.

## 2019-05-08 ENCOUNTER — TELEPHONE (OUTPATIENT)
Dept: FAMILY MEDICINE | Facility: CLINIC | Age: 84
End: 2019-05-08

## 2019-05-08 NOTE — TELEPHONE ENCOUNTER
----- Message from Thi Clayton sent at 5/8/2019  9:57 AM CDT -----  Contact: pt's son lyn nguyen [phone]  Type: Patient Call Back    Who called:lyn    What is the request in detail:his mother will come next may 15th @ 1:45pm maybe 2:00 pm for an eye exam. Please call lyn    Can the clinic reply by [facility]?    Would the patient rather a call back or a response via [facility]? Call back    Best call back number:pt's son lyn nguyen [phone]    Additional Information:

## 2019-05-10 ENCOUNTER — OFFICE VISIT (OUTPATIENT)
Dept: UROGYNECOLOGY | Facility: CLINIC | Age: 84
End: 2019-05-10
Payer: MEDICARE

## 2019-05-10 VITALS
DIASTOLIC BLOOD PRESSURE: 64 MMHG | BODY MASS INDEX: 25.58 KG/M2 | SYSTOLIC BLOOD PRESSURE: 114 MMHG | HEIGHT: 63 IN | WEIGHT: 144.38 LBS

## 2019-05-10 DIAGNOSIS — R39.15 URINARY URGENCY: ICD-10-CM

## 2019-05-10 DIAGNOSIS — N95.2 VAGINAL ATROPHY: ICD-10-CM

## 2019-05-10 DIAGNOSIS — K59.00 CONSTIPATION, UNSPECIFIED CONSTIPATION TYPE: ICD-10-CM

## 2019-05-10 DIAGNOSIS — N39.46 URINARY INCONTINENCE, MIXED: Primary | ICD-10-CM

## 2019-05-10 DIAGNOSIS — Z46.89 PESSARY MAINTENANCE: ICD-10-CM

## 2019-05-10 DIAGNOSIS — N81.11 MIDLINE CYSTOCELE: ICD-10-CM

## 2019-05-10 PROCEDURE — 99213 PR OFFICE/OUTPT VISIT, EST, LEVL III, 20-29 MIN: ICD-10-PCS | Mod: S$PBB,,, | Performed by: NURSE PRACTITIONER

## 2019-05-10 PROCEDURE — 99999 PR PBB SHADOW E&M-EST. PATIENT-LVL III: ICD-10-PCS | Mod: PBBFAC,,, | Performed by: NURSE PRACTITIONER

## 2019-05-10 PROCEDURE — 99999 PR PBB SHADOW E&M-EST. PATIENT-LVL III: CPT | Mod: PBBFAC,,, | Performed by: NURSE PRACTITIONER

## 2019-05-10 PROCEDURE — 99213 OFFICE O/P EST LOW 20 MIN: CPT | Mod: PBBFAC | Performed by: NURSE PRACTITIONER

## 2019-05-10 PROCEDURE — 99213 OFFICE O/P EST LOW 20 MIN: CPT | Mod: S$PBB,,, | Performed by: NURSE PRACTITIONER

## 2019-05-10 NOTE — PROGRESS NOTES
Urogyn follow up  2019  .  [facility]   [address] [address]    Ana Johnston  [id number]  1930      Ana Johnston is a 88 y.o.  here for a urogyn follow up of urinary urgency and urinary frequency.    Last HPI from 2019  1)  Urinary urgency/frequency:    --Daytime frequency: Q < 1 hours.  If holds, feels pain in bladder area.     --has been attending PT--doesn't think this is helping much; can't quantify U/F.  Feels that doing exercises makes her have increased bladder pressure/U/F.    --is only drinking 3 glasses water/day  --making sure you always have a nearby bathroom  --has been using vaginal estrogen 2x/week  **Patient was able to hold bladder >1.5 hour w/o U/F issues during visit .       2) Constipation:  --taking metamucil (1 T) daily  --has good and bad days  --is having some gas; uses gas X PRN     3)  Nocturia (nighttime urination):   --baseline: 4/night.   --now 2-4x/PM   --stops fluids 2 hours before bed/no water by bed  --does not have leg and swelling      4)  Stage 3 cervical prolapse, stage 2 cystocele/rectocele:  --wearing #3 ring + support  --feels is helping bulge   --no issues: no VB, pain, discharge  --scant pink discharge    Changes from last visit:  1)  Urinary urgency/frequency:    --Daytime frequency: Q 2 hours.  If holds, feels pain in bladder area.       --making sure you always have a nearby bathroom  --has been using vaginal estrogen 2x/week.       2) Constipation:  --taking metamucil (1 T) daily  --is having some gas; uses gas X PRN     3)  Nocturia (nighttime urination):   --baseline: 4/night.   --now 2-4x/PM   --stops fluids 2 hours before bed/no water by bed  --does not have leg and swelling      4)  Stage 3 cervical prolapse, stage 2 cystocele/rectocele:  --wearing #3 ring + support  --no issues: no VB, pain, discharge    States  recently       Past Medical History:   Diagnosis Date    AF (paroxysmal  "atrial fibrillation) 6/22/2017    Anemia     Anxiety     Arthritis     Cataract     Disorder of kidney and ureter     Encounter for blood transfusion     Hypertension     OAB (overactive bladder) 8/22/2017    Osteoporosis     Polyuria     Thyroid disease        Past Surgical History:   Procedure Laterality Date    ADENOIDECTOMY      CATARACT EXTRACTION      HYSTERECTOMY N/A 1965    KNEE SURGERY      TONSILLECTOMY         Current Outpatient Medications   Medication Sig    ACETAMINOPHEN (TYLENOL ORAL) Take by mouth 2 (two) times daily as needed.     amLODIPine (NORVASC) 5 MG tablet TAKE 1 TABLET BY MOUTH EVERY DAY    glycerin adult suppository Place 1 suppository rectally as needed for Constipation.    losartan-hydrochlorothiazide 50-12.5 mg (HYZAAR) 50-12.5 mg per tablet TAKE 1 TABLET BY MOUTH DAILY. For high blood pressure    metoprolol succinate (TOPROL-XL) 25 MG 24 hr tablet Take 1 tablet (25 mg total) by mouth once daily. For high blood pressure and to slow the heart down    sertraline (ZOLOFT) 100 MG tablet Take 1 tablet (100 mg total) by mouth once daily. For anxiety    aspirin 325 MG tablet Take 1 tablet (325 mg total) by mouth once daily.    estradiol (ESTRACE) 0.01 % (0.1 mg/gram) vaginal cream Place 0.5 g vaginally twice a week.     No current facility-administered medications for this visit.        Well woman:  Pap test: 20 years ago.  History of abnormal paps: No.  History of STIs:  No  Mammogram: Date of last: 3/2016.  Result: Normal  Colonoscopy: Date of last: 6/2016.  Result:  Normal.  Repeat due:  N/A.    DEXA:  Date of last:  Unknown, reports history of osteoperosis (hx of colchisine usage)    ROS:  As per HPI.      Exam  /64 (BP Location: Left arm, Patient Position: Sitting, BP Method: Large (Manual))   Ht 5' 3" (1.6 m)   Wt 65.5 kg (144 lb 6.4 oz)   BMI 25.58 kg/m²   General: alert and oriented, no acute distress  Respiratory: normal respiratory effort  Abd: soft, " non-tender, non-distended    Pelvic  Ext. Genitalia: normal external genitalia. Normal bartholin's and skeens glands  Vagina: + atrophy. Normal vaginal mucosa without lesions. No discharge noted.   Non-tender bladder base without palpable mass. #3 ring with support in place  Cervix: no cervical motion tenderness and no lesions  Uterus:  absent   Urethra: no masses or tenderness  Urethral meatus: no lesions, caruncle or prolapse.    Pessary removed without difficulty.  Washed with soap and water.  Reinserted without difficulty.    Impression  1. Urinary incontinence, mixed     2. Urinary urgency     3. Midline cystocele     4. Constipation, unspecified constipation type     5. Vaginal atrophy     6. Pessary maintenance       We reviewed the above issues and discussed options for short-term versus long-term management of her problems.   Plan:   1)  Urinary urgency/frequency:    --continue drinking 6 x 8 oz of water a day   --work on timed voids: SEE SHEET and COMPLETE.   --for flares: try uribel up to 4x/day; may make urine blue/green  --make sure you always have a nearby bathroom  --Empty bladder every 2- 3 hours.  Empty well: wait a minute, lean forward on toilet.    --Avoid dietary irritants (see sheet).  Keep diary x 3-5 days to determine your irritants.  --For dry mouth: get sour, sugar free lozenge or gum.    --continue pelvic floor PT exercises daily  --URGE: consider medication in future.  Takes 2-4 weeks to see if will have effect.  For dry mouth: get sour, sugar free lozenge or gum.  Hx of Vesicare, Oxybutynin (lack of results)     2) Constipation with increased gas:  --continue fiber 1 Tablespoon a day  --for episodes of constipation:  Take1-2 stool softeners a day as needed (rx sent to [facility] but may need to get over the counter)  --controlling bowel movements may help with bladder issues  --look at gas sheet and work on things to reduce gas     3)  Nocturia (nighttime urination): stop fluids 2 hours before  bed/no water by bed.  If have leg swelling:  Elevate feet above chest x 1 hour before bed to get excess fluid off.  Can also use support hose (knee highs).       4)  Stage 3 cervical prolapse, stage 2 cystocele/rectocele:  --continue  #3 ring + support.        5)   Vaginal atrophy (dryness):  Use dime-sized amount of estrogen cream in vagina and around opening twice a week.    Apply internally to knuckle and around vaginal opening.       6)   vulvar irritation  --improved  --continue moisture barrier    7)RTC 3 months for pc      30 minutes were spent in face to face time with this patient  90 % of this time was spent in counseling and/or coordination of care    GEMMA Gonzalez-BC  [facility]  Division of Female Pelvic Medicine and Reconstructive Surgery  Department of Obstetrics & Gynecology

## 2019-05-10 NOTE — PATIENT INSTRUCTIONS
1)  Urinary urgency/frequency:    --continue drinking 6 x 8 oz of water a day   --work on timed voids: SEE SHEET and COMPLETE.   --for flares: try uribel up to 4x/day; may make urine blue/green  --make sure you always have a nearby bathroom  --Empty bladder every 2- 3 hours.  Empty well: wait a minute, lean forward on toilet.    --Avoid dietary irritants (see sheet).  Keep diary x 3-5 days to determine your irritants.  --For dry mouth: get sour, sugar free lozenge or gum.    --continue pelvic floor PT exercises daily  --URGE: consider medication in future.  Takes 2-4 weeks to see if will have effect.  For dry mouth: get sour, sugar free lozenge or gum.  Hx of Vesicare, Oxybutynin (lack of results)     2) Constipation with increased gas:  --continue fiber 1 Tablespoon a day  --for episodes of constipation:  Take1-2 stool softeners a day as needed (rx sent to [facility] but may need to get over the counter)  --controlling bowel movements may help with bladder issues  --look at gas sheet and work on things to reduce gas     3)  Nocturia (nighttime urination): stop fluids 2 hours before bed/no water by bed.  If have leg swelling:  Elevate feet above chest x 1 hour before bed to get excess fluid off.  Can also use support hose (knee highs).       4)  Stage 3 cervical prolapse, stage 2 cystocele/rectocele:  --continue  #3 ring + support.        5)   Vaginal atrophy (dryness):  Use dime-sized amount of estrogen cream in vagina and around opening twice a week.    Apply internally to knuckle and around vaginal opening.       6)   vulvar irritation  --improved  --continue moisture barrier    7)RTC 3 months for pc

## 2019-05-24 ENCOUNTER — PATIENT OUTREACH (OUTPATIENT)
Dept: ADMINISTRATIVE | Facility: HOSPITAL | Age: 84
End: 2019-05-24

## 2019-06-17 ENCOUNTER — OFFICE VISIT (OUTPATIENT)
Dept: FAMILY MEDICINE | Facility: CLINIC | Age: 84
End: 2019-06-17
Payer: MEDICARE

## 2019-06-17 VITALS
BODY MASS INDEX: 25.98 KG/M2 | HEIGHT: 63 IN | RESPIRATION RATE: 16 BRPM | DIASTOLIC BLOOD PRESSURE: 70 MMHG | HEART RATE: 77 BPM | WEIGHT: 146.63 LBS | OXYGEN SATURATION: 96 % | SYSTOLIC BLOOD PRESSURE: 106 MMHG | TEMPERATURE: 98 F

## 2019-06-17 DIAGNOSIS — F43.21 GRIEVING: ICD-10-CM

## 2019-06-17 DIAGNOSIS — F02.818 LATE ONSET ALZHEIMER'S DISEASE WITH BEHAVIORAL DISTURBANCE: Primary | ICD-10-CM

## 2019-06-17 DIAGNOSIS — F41.9 ANXIETY: ICD-10-CM

## 2019-06-17 DIAGNOSIS — G30.1 LATE ONSET ALZHEIMER'S DISEASE WITH BEHAVIORAL DISTURBANCE: Primary | ICD-10-CM

## 2019-06-17 PROCEDURE — 99999 PR PBB SHADOW E&M-EST. PATIENT-LVL III: ICD-10-PCS | Mod: PBBFAC,,, | Performed by: FAMILY MEDICINE

## 2019-06-17 PROCEDURE — 99214 OFFICE O/P EST MOD 30 MIN: CPT | Mod: S$PBB,,, | Performed by: FAMILY MEDICINE

## 2019-06-17 PROCEDURE — 99999 PR PBB SHADOW E&M-EST. PATIENT-LVL III: CPT | Mod: PBBFAC,,, | Performed by: FAMILY MEDICINE

## 2019-06-17 PROCEDURE — 99214 PR OFFICE/OUTPT VISIT, EST, LEVL IV, 30-39 MIN: ICD-10-PCS | Mod: S$PBB,,, | Performed by: FAMILY MEDICINE

## 2019-06-17 PROCEDURE — 99213 OFFICE O/P EST LOW 20 MIN: CPT | Mod: PBBFAC,PO | Performed by: FAMILY MEDICINE

## 2019-06-17 NOTE — PROGRESS NOTES
Subjective:       Patient ID: Ana Johnston is a 88 y.o. female.    Chief Complaint: MCi (6 wks follow up ) and grieving (6 wks follow up )    HPI    Grieving - pts sxs have improved. She is getting involved with friends in her community     Dementia - pt continues to have issues with memory and cognitive deficiencies.      Patient son and discussed her LaPost today.  Patient is a DNR and would like no extraordinary measures if she were to go into cardiac arrest or respiratory arrest.  Is not want PEG feeds or other aggressive measures.    Current Outpatient Medications on File Prior to Visit   Medication Sig Dispense Refill    ACETAMINOPHEN (TYLENOL ORAL) Take by mouth 2 (two) times daily as needed.       amLODIPine (NORVASC) 5 MG tablet TAKE 1 TABLET BY MOUTH EVERY DAY 90 tablet 2    aspirin 325 MG tablet Take 1 tablet (325 mg total) by mouth once daily.  0    estradiol (ESTRACE) 0.01 % (0.1 mg/gram) vaginal cream Place 0.5 g vaginally twice a week. 42.5 g 11    losartan-hydrochlorothiazide 50-12.5 mg (HYZAAR) 50-12.5 mg per tablet TAKE 1 TABLET BY MOUTH DAILY. For high blood pressure 90 tablet 3    metoprolol succinate (TOPROL-XL) 25 MG 24 hr tablet Take 1 tablet (25 mg total) by mouth once daily. For high blood pressure and to slow the heart down 90 tablet 3    sertraline (ZOLOFT) 100 MG tablet Take 1 tablet (100 mg total) by mouth once daily. For anxiety 90 tablet 3    glycerin adult suppository Place 1 suppository rectally as needed for Constipation.       No current facility-administered medications on file prior to visit.        Past Medical History:   Diagnosis Date    AF (paroxysmal atrial fibrillation) 6/22/2017    Anemia     Anxiety     Arthritis     Cataract     Disorder of kidney and ureter     Encounter for blood transfusion     Hypertension     Late onset Alzheimer's disease with behavioral disturbance 6/18/2019    OAB (overactive bladder) 8/22/2017    Osteoporosis     Polyuria      Thyroid disease        Family History   Problem Relation Age of Onset    Arthritis Mother     Thyroid disease Mother     Heart attack Father     Diabetes Father     Liver disease Father     Arthritis Sister     Osteoporosis Sister     Hypertension Sister     Early death Brother     Asthma Son     Tuberculosis Maternal Grandmother     Early death Sister     Thyroid disease Other     Cervical cancer Neg Hx     Endometrial cancer Neg Hx     Vaginal cancer Neg Hx         reports that she has quit smoking. She has never used smokeless tobacco. She reports that she drinks about 1.8 oz of alcohol per week. She reports that she does not use drugs.    Review of Systems   Constitutional: Negative for activity change and unexpected weight change.   HENT: Negative for hearing loss, rhinorrhea and trouble swallowing.    Eyes: Negative for discharge and visual disturbance.   Respiratory: Negative for chest tightness and wheezing.    Cardiovascular: Negative for chest pain and palpitations.   Gastrointestinal: Positive for constipation. Negative for blood in stool, diarrhea and vomiting.   Endocrine: Negative for polydipsia and polyuria.   Genitourinary: Negative for difficulty urinating, dysuria, hematuria and menstrual problem.   Musculoskeletal: Negative for arthralgias, joint swelling and neck pain.   Neurological: Negative for weakness and headaches.   Psychiatric/Behavioral: Positive for confusion and dysphoric mood.       Objective:     Vitals:    06/17/19 1450   BP: 106/70   Pulse: 77   Resp: 16   Temp: 98.3 °F (36.8 °C)        Physical Exam   Constitutional: She appears well-developed. No distress.   Antalgic gait - walks with rolling walker   HENT:   Head: Normocephalic and atraumatic.   Eyes: Conjunctivae are normal. No scleral icterus.   Pulmonary/Chest: Effort normal.   Musculoskeletal:   Thoracic Kyphosis   Neurological: She is alert.   Skin: She is not diaphoretic.   Psychiatric: She has a normal  mood and affect. Thought content normal. Her speech is not rapid and/or pressured. She is slowed. She is not agitated, not hyperactive, not withdrawn and not combative. Thought content is not paranoid. Cognition and memory are impaired. She does not express impulsivity or inappropriate judgment. She expresses no homicidal plans. She exhibits abnormal recent memory and abnormal remote memory.   Vitals reviewed.      Assessment:       1. Late onset Alzheimer's disease with behavioral disturbance    2. Anxiety    3. Grieving        Plan:       Ana was seen today for mci and grieving.    Diagnoses and all orders for this visit:    Late onset Alzheimer's disease with behavioral disturbance    Anxiety    Grieving     Chronic-stable-as above we discussed and filled out her LaPost today.  I discussed with her son the pros and cons of medications for dementia or for abnormal behaviors.  She does not exhibit any behaviors that are causing CT concerns at this time.  After discussing the risks and benefits we opted not to treat her medically for her memory loss or for the mood disturbances at this time.  She does not show any great amount of depression or anxiety and seems to be faring well on Zoloft so will continue this.  She seems to be doing much better and her grieving process after the passing of her  few months ago.  She has been more active in her nursing home community.    Anxiety - chronic - stable - continue current medications.     Grieving - progressing as expected.     Pt or to inform me if they develop any new or worsening sxs. They also have been notified that they can contact me for any other concerns.           Follow up in about 2 months (around 8/17/2019), or dementia.    Pt verbalized understanding and agreed with our plan.

## 2019-06-18 PROBLEM — F02.818 LATE ONSET ALZHEIMER'S DISEASE WITH BEHAVIORAL DISTURBANCE: Status: ACTIVE | Noted: 2019-06-18

## 2019-06-18 PROBLEM — E21.0 PRIMARY HYPERPARATHYROIDISM: Status: RESOLVED | Noted: 2017-06-22 | Resolved: 2019-06-18

## 2019-06-18 PROBLEM — G30.1 LATE ONSET ALZHEIMER'S DISEASE WITH BEHAVIORAL DISTURBANCE: Status: ACTIVE | Noted: 2019-06-18

## 2019-07-30 ENCOUNTER — TELEPHONE (OUTPATIENT)
Dept: FAMILY MEDICINE | Facility: CLINIC | Age: 84
End: 2019-07-30

## 2019-07-30 DIAGNOSIS — I10 HYPERTENSION, WELL CONTROLLED: ICD-10-CM

## 2019-07-30 DIAGNOSIS — I48.0 AF (PAROXYSMAL ATRIAL FIBRILLATION): ICD-10-CM

## 2019-07-30 DIAGNOSIS — F41.9 ANXIETY: ICD-10-CM

## 2019-07-30 NOTE — TELEPHONE ENCOUNTER
----- Message from Tom Anderson sent at 7/30/2019  1:49 PM CDT -----  Contact: Edgar from [facility] [phone]  Type: RX Refill Request    Who Called: Edgar     Refill or New Rx:Refill     RX Name and Strength:(#all current medications, even the over the counters#)    Is this a 30 day or 90 day RX:90 day    Preferred Pharmacy with phone number:[facility] pharmacy, fax number [phone]    Would the patient rather a call back or a response via [facility]? Call back    Best Call Back Number:[phone]

## 2019-07-30 NOTE — TELEPHONE ENCOUNTER
Spoke to Edgar, they need pts medication sent to mail order pharmacy, even OTC meds; called pharmacy to verify it is [facility] pharmacy in [place]

## 2019-07-30 NOTE — TELEPHONE ENCOUNTER
----- Message from Tom Anderson sent at 7/30/2019  1:44 PM CDT -----  Contact: Edgar from [facility] [phone]  Type: Patient Call Back    Who called:Edgar     What is the request in detail: Edgar from [facility] called to get a current medication list and diagnosis of patient faxed over. It can be faxed over to [phone]    Can the clinic reply by [facility]?no    Would the patient rather a call back or a response via [facility]? Call back    Best call back number:[phone]

## 2019-08-01 RX ORDER — AMLODIPINE BESYLATE 5 MG/1
5 TABLET ORAL DAILY
Qty: 90 TABLET | Refills: 2 | Status: SHIPPED | OUTPATIENT
Start: 2019-08-01 | End: 2019-08-22

## 2019-08-01 RX ORDER — ASPIRIN 325 MG
325 TABLET ORAL DAILY
Qty: 90 TABLET | Refills: 3 | Status: SHIPPED | OUTPATIENT
Start: 2019-08-01 | End: 2020-07-31

## 2019-08-01 RX ORDER — GLYCERIN 1 G/1
1 SUPPOSITORY RECTAL
Qty: 50 SUPPOSITORY | Refills: 0 | Status: SHIPPED | OUTPATIENT
Start: 2019-08-01

## 2019-08-01 RX ORDER — METOPROLOL SUCCINATE 25 MG/1
25 TABLET, EXTENDED RELEASE ORAL DAILY
Qty: 90 TABLET | Refills: 3 | Status: SHIPPED | OUTPATIENT
Start: 2019-08-01

## 2019-08-01 RX ORDER — LOSARTAN POTASSIUM AND HYDROCHLOROTHIAZIDE 12.5; 5 MG/1; MG/1
TABLET ORAL
Qty: 90 TABLET | Refills: 3 | Status: SHIPPED | OUTPATIENT
Start: 2019-08-01

## 2019-08-01 RX ORDER — ACETAMINOPHEN 325 MG/1
325 TABLET ORAL 2 TIMES DAILY PRN
Qty: 180 TABLET | Refills: 3 | Status: SHIPPED | OUTPATIENT
Start: 2019-08-01

## 2019-08-01 RX ORDER — SERTRALINE HYDROCHLORIDE 100 MG/1
100 TABLET, FILM COATED ORAL DAILY
Qty: 90 TABLET | Refills: 3 | Status: SHIPPED | OUTPATIENT
Start: 2019-08-01 | End: 2019-09-23

## 2019-08-01 RX ORDER — ESTRADIOL 0.1 MG/G
0.5 CREAM VAGINAL
Qty: 42.5 G | Refills: 11 | Status: SHIPPED | OUTPATIENT
Start: 2019-08-01 | End: 2020-07-31

## 2019-08-06 DIAGNOSIS — F41.9 ANXIETY: ICD-10-CM

## 2019-08-09 ENCOUNTER — OFFICE VISIT (OUTPATIENT)
Dept: UROGYNECOLOGY | Facility: CLINIC | Age: 84
End: 2019-08-09
Payer: MEDICARE

## 2019-08-09 VITALS — WEIGHT: 139.31 LBS | BODY MASS INDEX: 24.68 KG/M2 | HEIGHT: 63 IN

## 2019-08-09 DIAGNOSIS — N39.46 URINARY INCONTINENCE, MIXED: Primary | ICD-10-CM

## 2019-08-09 DIAGNOSIS — N95.2 VAGINAL ATROPHY: ICD-10-CM

## 2019-08-09 DIAGNOSIS — Z46.89 PESSARY MAINTENANCE: ICD-10-CM

## 2019-08-09 DIAGNOSIS — N81.11 MIDLINE CYSTOCELE: ICD-10-CM

## 2019-08-09 DIAGNOSIS — K59.00 CONSTIPATION, UNSPECIFIED CONSTIPATION TYPE: ICD-10-CM

## 2019-08-09 PROCEDURE — 99213 PR OFFICE/OUTPT VISIT, EST, LEVL III, 20-29 MIN: ICD-10-PCS | Mod: S$PBB,,, | Performed by: NURSE PRACTITIONER

## 2019-08-09 PROCEDURE — 99213 OFFICE O/P EST LOW 20 MIN: CPT | Mod: PBBFAC | Performed by: NURSE PRACTITIONER

## 2019-08-09 PROCEDURE — 99213 OFFICE O/P EST LOW 20 MIN: CPT | Mod: S$PBB,,, | Performed by: NURSE PRACTITIONER

## 2019-08-09 PROCEDURE — 99999 PR PBB SHADOW E&M-EST. PATIENT-LVL III: ICD-10-PCS | Mod: PBBFAC,,, | Performed by: NURSE PRACTITIONER

## 2019-08-09 PROCEDURE — 99999 PR PBB SHADOW E&M-EST. PATIENT-LVL III: CPT | Mod: PBBFAC,,, | Performed by: NURSE PRACTITIONER

## 2019-08-09 NOTE — PATIENT INSTRUCTIONS
1)  Urinary urgency/frequency:    --continue drinking 6 x 8 oz of water a day   --work on timed voids: SEE SHEET and COMPLETE.   --for flares: try uribel up to 4x/day; may make urine blue/green  --make sure you always have a nearby bathroom  --Empty bladder every 2- 3 hours.  Empty well: wait a minute, lean forward on toilet.    --Avoid dietary irritants (see sheet).  Keep diary x 3-5 days to determine your irritants.  --For dry mouth: get sour, sugar free lozenge or gum.    --continue pelvic floor PT exercises daily  --URGE: consider medication in future.  Takes 2-4 weeks to see if will have effect.  For dry mouth: get sour, sugar free lozenge or gum.  Hx of Vesicare, Oxybutynin (lack of results)     2) Constipation with increased gas:  --continue fiber 1 Tablespoon a day  --for episodes of constipation:  Take1-2 stool softeners a day as needed (rx sent to [facility] but may need to get over the counter)  --controlling bowel movements may help with bladder issues  --look at gas sheet and work on things to reduce gas     3)  Nocturia (nighttime urination): stop fluids 2 hours before bed/no water by bed.  If have leg swelling:  Elevate feet above chest x 1 hour before bed to get excess fluid off.  Can also use support hose (knee highs).       4)  Stage 3 cervical prolapse, stage 2 cystocele/rectocele:  --continue  #3 ring + support.        5)   Vaginal atrophy (dryness):  Use dime-sized amount of estrogen cream in vagina and around opening twice a week.    Apply internally to knuckle and around vaginal opening.       6)   vulvar irritation  --improved  --continue moisture barrier    7)RTC 3 months for pc

## 2019-08-09 NOTE — PROGRESS NOTES
Urogyn follow up  2019  .  [facility]   [address] [address]    Ana Johnston  [id number]  1930      Ana Johnston is a 88 y.o.  here for a urogyn follow up of urinary urgency and urinary frequency.    Last HPI from 2019  1)  Urinary urgency/frequency:    --Daytime frequency: Q 2 hours.  If holds, feels pain in bladder area.       --making sure you always have a nearby bathroom  --has been using vaginal estrogen 2x/week.       2) Constipation:  --taking metamucil (1 T) daily  --is having some gas; uses gas X PRN     3)  Nocturia (nighttime urination):   --baseline: 4/night.   --now 2-4x/PM   --stops fluids 2 hours before bed/no water by bed  --does not have leg and swelling      4)  Stage 3 cervical prolapse, stage 2 cystocele/rectocele:  --wearing #3 ring + support  --no issues: no VB, pain, discharge    States  recently     Changes from last visit:  1)  Urinary urgency/frequency:    --Daytime frequency: Q 2 hours.   --has been using vaginal estrogen 2x/week.       2) Constipation:  --taking metamucil (1 T) daily  --is having some gas; uses gas X PRN     3)  Nocturia (nighttime urination):   --baseline: 4/night.   --now 2-4x/PM   --stops fluids 2 hours before bed/no water by bed  --does not have leg and swelling      4)  Stage 3 cervical prolapse, stage 2 cystocele/rectocele:  --wearing #3 ring + support  --no issues: no VB, pain, discharge          Past Medical History:   Diagnosis Date    AF (paroxysmal atrial fibrillation) 2017    Anemia     Anxiety     Arthritis     Cataract     Disorder of kidney and ureter     Encounter for blood transfusion     Hypertension     Late onset Alzheimer's disease with behavioral disturbance 2019    OAB (overactive bladder) 2017    Osteoporosis     Polyuria     Thyroid disease        Past Surgical History:   Procedure Laterality Date    ADENOIDECTOMY      CATARACT EXTRACTION    "   HYSTERECTOMY N/A 1965    KNEE SURGERY      TONSILLECTOMY         Current Outpatient Medications   Medication Sig    amLODIPine (NORVASC) 5 MG tablet Take 1 tablet (5 mg total) by mouth once daily.    estradiol (ESTRACE) 0.01 % (0.1 mg/gram) vaginal cream Place 0.5 g vaginally twice a week.    losartan-hydrochlorothiazide 50-12.5 mg (HYZAAR) 50-12.5 mg per tablet TAKE 1 TABLET BY MOUTH DAILY. For high blood pressure    metoprolol succinate (TOPROL-XL) 25 MG 24 hr tablet Take 1 tablet (25 mg total) by mouth once daily. For high blood pressure and to slow the heart down    sertraline (ZOLOFT) 100 MG tablet Take 1 tablet (100 mg total) by mouth once daily. For anxiety    acetaminophen (TYLENOL) 325 MG tablet Take 1 tablet (325 mg total) by mouth 2 (two) times daily as needed.    aspirin 325 MG tablet Take 1 tablet (325 mg total) by mouth once daily.    glycerin adult suppository Place 1 suppository rectally as needed for Constipation.     No current facility-administered medications for this visit.        Well woman:  Pap test: 20 years ago.  History of abnormal paps: No.  History of STIs:  No  Mammogram: Date of last: 3/2016.  Result: Normal  Colonoscopy: Date of last: 6/2016.  Result:  Normal.  Repeat due:  N/A.    DEXA:  Date of last:  Unknown, reports history of osteoperosis (hx of colchisine usage)    ROS:  As per HPI.      Exam  BP (P) 110/70 (BP Location: Right arm, Patient Position: Sitting, BP Method: Medium (Manual))   Ht 5' 3" (1.6 m)   Wt 63.2 kg (139 lb 5.3 oz)   BMI 24.68 kg/m²   General: alert and oriented, no acute distress  Respiratory: normal respiratory effort  Abd: soft, non-tender, non-distended    Pelvic  Ext. Genitalia: normal external genitalia. Normal bartholin's and skeens glands  Vagina: + atrophy. Normal vaginal mucosa without lesions. No discharge noted.   Non-tender bladder base without palpable mass. #3 ring with support in place  Cervix: no cervical motion tenderness and " no lesions  Uterus:  absent   Urethra: no masses or tenderness  Urethral meatus: no lesions, caruncle or prolapse.    Pessary removed without difficulty.  Washed with soap and water.  Reinserted without difficulty.    Impression  1. Urinary incontinence, mixed     2. Midline cystocele     3. Constipation, unspecified constipation type     4. Vaginal atrophy     5. Pessary maintenance       We reviewed the above issues and discussed options for short-term versus long-term management of her problems.   Plan:   1)  Urinary urgency/frequency:    --continue drinking 6 x 8 oz of water a day   --work on timed voids: SEE SHEET and COMPLETE.   --for flares: try uribel up to 4x/day; may make urine blue/green  --make sure you always have a nearby bathroom  --Empty bladder every 2- 3 hours.  Empty well: wait a minute, lean forward on toilet.    --Avoid dietary irritants (see sheet).  Keep diary x 3-5 days to determine your irritants.  --For dry mouth: get sour, sugar free lozenge or gum.    --continue pelvic floor PT exercises daily  --URGE: consider medication in future.  Takes 2-4 weeks to see if will have effect.  For dry mouth: get sour, sugar free lozenge or gum.  Hx of Vesicare, Oxybutynin (lack of results)     2) Constipation with increased gas:  --continue fiber 1 Tablespoon a day  --for episodes of constipation:  Take1-2 stool softeners a day as needed (rx sent to [facility] but may need to get over the counter)  --controlling bowel movements may help with bladder issues  --look at gas sheet and work on things to reduce gas     3)  Nocturia (nighttime urination): stop fluids 2 hours before bed/no water by bed.  If have leg swelling:  Elevate feet above chest x 1 hour before bed to get excess fluid off.  Can also use support hose (knee highs).       4)  Stage 3 cervical prolapse, stage 2 cystocele/rectocele:  --continue  #3 ring + support.        5)   Vaginal atrophy (dryness):  Use dime-sized amount of estrogen cream in  vagina and around opening twice a week.    Apply internally to knuckle and around vaginal opening.       6)   vulvar irritation  --improved  --continue moisture barrier    7)RTC 3 months for pc      30 minutes were spent in face to face time with this patient  90 % of this time was spent in counseling and/or coordination of care    GEMMA Gonzalez-BC [facility]  Division of Female Pelvic Medicine and Reconstructive Surgery  Department of Obstetrics & Gynecology

## 2019-08-11 RX ORDER — SERTRALINE HYDROCHLORIDE 100 MG/1
TABLET, FILM COATED ORAL
Qty: 90 TABLET | Refills: 3 | OUTPATIENT
Start: 2019-08-11

## 2019-08-15 ENCOUNTER — TELEPHONE (OUTPATIENT)
Dept: FAMILY MEDICINE | Facility: CLINIC | Age: 84
End: 2019-08-15

## 2019-08-15 NOTE — TELEPHONE ENCOUNTER
Attempt to contact patient and emergency contact to inquire how patient is doing at this time. No answer. Left message to return call to clinic.

## 2019-08-16 ENCOUNTER — TELEPHONE (OUTPATIENT)
Dept: FAMILY MEDICINE | Facility: CLINIC | Age: 84
End: 2019-08-16

## 2019-08-16 NOTE — TELEPHONE ENCOUNTER
----- Message from Brendan Antelmo sent at 8/16/2019 11:12 AM CDT -----  Contact: Antolin-pt's son  Type:  Patient Returning Call    Who Called: Latha Tovar LPN        Who Left Message for Patient: Latha Tovar LPN        Does the patient know what this is regarding?Antolin states pt is not doing too well. He would like pt to be seen next Tuesday or Wednesday. Contact to further discuss. Thanks-    Best Call Back Number:[phone]    Additional Information:

## 2019-08-20 ENCOUNTER — TELEPHONE (OUTPATIENT)
Dept: FAMILY MEDICINE | Facility: CLINIC | Age: 84
End: 2019-08-20

## 2019-08-20 NOTE — TELEPHONE ENCOUNTER
I spoke to patients son who is aware of her sxs, and believe that she can wait to be seen by me in 2 days. No sudden changes, but she has had more difficulty with her memory and does home some worsening bilateral swelling that he attributes to inactivity.

## 2019-08-20 NOTE — TELEPHONE ENCOUNTER
Staff from assisted living called to report pt has 3+ edema to bilat lower extremities and it is painful to touch; pt does have OV scheduled with you for Thursday 8/22; she states they also sent over fax regarding swelling

## 2019-08-22 ENCOUNTER — OFFICE VISIT (OUTPATIENT)
Dept: FAMILY MEDICINE | Facility: CLINIC | Age: 84
End: 2019-08-22
Payer: MEDICARE

## 2019-08-22 ENCOUNTER — LAB VISIT (OUTPATIENT)
Dept: LAB | Facility: HOSPITAL | Age: 84
End: 2019-08-22
Attending: FAMILY MEDICINE
Payer: MEDICARE

## 2019-08-22 VITALS
WEIGHT: 141.56 LBS | BODY MASS INDEX: 25.08 KG/M2 | SYSTOLIC BLOOD PRESSURE: 118 MMHG | RESPIRATION RATE: 16 BRPM | HEART RATE: 105 BPM | HEIGHT: 63 IN | OXYGEN SATURATION: 96 % | TEMPERATURE: 98 F | DIASTOLIC BLOOD PRESSURE: 88 MMHG

## 2019-08-22 DIAGNOSIS — I48.0 AF (PAROXYSMAL ATRIAL FIBRILLATION): ICD-10-CM

## 2019-08-22 DIAGNOSIS — R30.0 DYSURIA: Primary | ICD-10-CM

## 2019-08-22 DIAGNOSIS — R60.0 PEDAL EDEMA: ICD-10-CM

## 2019-08-22 DIAGNOSIS — R41.0 CONFUSION: ICD-10-CM

## 2019-08-22 LAB
ALBUMIN SERPL BCP-MCNC: 3.2 G/DL (ref 3.5–5.2)
ALP SERPL-CCNC: 77 U/L (ref 55–135)
ALT SERPL W/O P-5'-P-CCNC: 19 U/L (ref 10–44)
ANION GAP SERPL CALC-SCNC: 13 MMOL/L (ref 8–16)
AST SERPL-CCNC: 25 U/L (ref 10–40)
BACTERIA #/AREA URNS AUTO: ABNORMAL /HPF
BASOPHILS # BLD AUTO: 0.02 K/UL (ref 0–0.2)
BASOPHILS NFR BLD: 0.2 % (ref 0–1.9)
BILIRUB SERPL-MCNC: 0.8 MG/DL (ref 0.1–1)
BILIRUB SERPL-MCNC: NORMAL MG/DL
BILIRUB UR QL STRIP: NEGATIVE
BLOOD URINE, POC: NORMAL
BNP SERPL-MCNC: 1883 PG/ML (ref 0–99)
BUN SERPL-MCNC: 33 MG/DL (ref 8–23)
CALCIUM SERPL-MCNC: 9.4 MG/DL (ref 8.7–10.5)
CHLORIDE SERPL-SCNC: 97 MMOL/L (ref 95–110)
CLARITY UR REFRACT.AUTO: ABNORMAL
CO2 SERPL-SCNC: 28 MMOL/L (ref 23–29)
COLOR UR AUTO: ABNORMAL
COLOR, POC UA: NORMAL
CREAT SERPL-MCNC: 1 MG/DL (ref 0.5–1.4)
DIFFERENTIAL METHOD: ABNORMAL
EOSINOPHIL # BLD AUTO: 0 K/UL (ref 0–0.5)
EOSINOPHIL NFR BLD: 0.5 % (ref 0–8)
ERYTHROCYTE [DISTWIDTH] IN BLOOD BY AUTOMATED COUNT: 15.3 % (ref 11.5–14.5)
EST. GFR  (AFRICAN AMERICAN): 58.1 ML/MIN/1.73 M^2
EST. GFR  (NON AFRICAN AMERICAN): 50.4 ML/MIN/1.73 M^2
GLUCOSE SERPL-MCNC: 84 MG/DL (ref 70–110)
GLUCOSE UR QL STRIP: NEGATIVE
GLUCOSE UR QL STRIP: NORMAL
HCT VFR BLD AUTO: 45 % (ref 37–48.5)
HGB BLD-MCNC: 14.3 G/DL (ref 12–16)
HGB UR QL STRIP: NEGATIVE
IMM GRANULOCYTES # BLD AUTO: 0.02 K/UL (ref 0–0.04)
IMM GRANULOCYTES NFR BLD AUTO: 0.2 % (ref 0–0.5)
KETONES UR QL STRIP: NEGATIVE
KETONES UR QL STRIP: NORMAL
LEUKOCYTE ESTERASE UR QL STRIP: ABNORMAL
LEUKOCYTE ESTERASE URINE, POC: NORMAL
LYMPHOCYTES # BLD AUTO: 1.3 K/UL (ref 1–4.8)
LYMPHOCYTES NFR BLD: 15.4 % (ref 18–48)
MCH RBC QN AUTO: 31 PG (ref 27–31)
MCHC RBC AUTO-ENTMCNC: 31.8 G/DL (ref 32–36)
MCV RBC AUTO: 98 FL (ref 82–98)
MICROSCOPIC COMMENT: ABNORMAL
MONOCYTES # BLD AUTO: 0.7 K/UL (ref 0.3–1)
MONOCYTES NFR BLD: 8.3 % (ref 4–15)
NEUTROPHILS # BLD AUTO: 6.3 K/UL (ref 1.8–7.7)
NEUTROPHILS NFR BLD: 75.4 % (ref 38–73)
NITRITE UR QL STRIP: NEGATIVE
NITRITE, POC UA: NORMAL
NRBC BLD-RTO: 0 /100 WBC
PH UR STRIP: 5 [PH] (ref 5–8)
PH, POC UA: 6
PLATELET # BLD AUTO: 226 K/UL (ref 150–350)
PMV BLD AUTO: 11.9 FL (ref 9.2–12.9)
POTASSIUM SERPL-SCNC: 3.7 MMOL/L (ref 3.5–5.1)
PROT SERPL-MCNC: 6.5 G/DL (ref 6–8.4)
PROT UR QL STRIP: NEGATIVE
PROTEIN, POC: NORMAL
RBC # BLD AUTO: 4.61 M/UL (ref 4–5.4)
RBC #/AREA URNS AUTO: 2 /HPF (ref 0–4)
SODIUM SERPL-SCNC: 138 MMOL/L (ref 136–145)
SP GR UR STRIP: 1.02 (ref 1–1.03)
SPECIFIC GRAVITY, POC UA: 1.01
SQUAMOUS #/AREA URNS AUTO: 8 /HPF
URN SPEC COLLECT METH UR: ABNORMAL
UROBILINOGEN, POC UA: NORMAL
WBC # BLD AUTO: 8.36 K/UL (ref 3.9–12.7)
WBC #/AREA URNS AUTO: 52 /HPF (ref 0–5)
WBC CLUMPS UR QL AUTO: ABNORMAL

## 2019-08-22 PROCEDURE — 87186 SC STD MICRODIL/AGAR DIL: CPT

## 2019-08-22 PROCEDURE — 87077 CULTURE AEROBIC IDENTIFY: CPT

## 2019-08-22 PROCEDURE — 99999 PR PBB SHADOW E&M-EST. PATIENT-LVL V: CPT | Mod: PBBFAC,,, | Performed by: FAMILY MEDICINE

## 2019-08-22 PROCEDURE — 87086 URINE CULTURE/COLONY COUNT: CPT

## 2019-08-22 PROCEDURE — 36415 COLL VENOUS BLD VENIPUNCTURE: CPT | Mod: PO

## 2019-08-22 PROCEDURE — 85025 COMPLETE CBC W/AUTO DIFF WBC: CPT

## 2019-08-22 PROCEDURE — 99999 PR PBB SHADOW E&M-EST. PATIENT-LVL V: ICD-10-PCS | Mod: PBBFAC,,, | Performed by: FAMILY MEDICINE

## 2019-08-22 PROCEDURE — 99214 PR OFFICE/OUTPT VISIT, EST, LEVL IV, 30-39 MIN: ICD-10-PCS | Mod: S$PBB,,, | Performed by: FAMILY MEDICINE

## 2019-08-22 PROCEDURE — 81001 URINALYSIS AUTO W/SCOPE: CPT | Mod: PBBFAC,PO | Performed by: FAMILY MEDICINE

## 2019-08-22 PROCEDURE — 80053 COMPREHEN METABOLIC PANEL: CPT

## 2019-08-22 PROCEDURE — 81001 URINALYSIS AUTO W/SCOPE: CPT

## 2019-08-22 PROCEDURE — 87088 URINE BACTERIA CULTURE: CPT

## 2019-08-22 PROCEDURE — 99214 OFFICE O/P EST MOD 30 MIN: CPT | Mod: S$PBB,,, | Performed by: FAMILY MEDICINE

## 2019-08-22 PROCEDURE — 83880 ASSAY OF NATRIURETIC PEPTIDE: CPT

## 2019-08-22 PROCEDURE — 99215 OFFICE O/P EST HI 40 MIN: CPT | Mod: PBBFAC,PO | Performed by: FAMILY MEDICINE

## 2019-08-22 NOTE — PROGRESS NOTES
Subjective:       Patient ID: Ana Johnston is a 88 y.o. female.    Chief Complaint: Dementia (follow up )    HPI    Dysuria - onset possibly up to a month ago. Noted by NH staff.  + Urgency. No f/c/n/v.     Pedal Edema -- onset of worsening edema about one month ago. Not a/w cp, SOB, palpitations.     A. Fib - adherent with ASA 325mg. No cp or sob. Worsening pedal edema as above.       Current Outpatient Medications on File Prior to Visit   Medication Sig Dispense Refill    acetaminophen (TYLENOL) 325 MG tablet Take 1 tablet (325 mg total) by mouth 2 (two) times daily as needed. 180 tablet 3    aspirin 325 MG tablet Take 1 tablet (325 mg total) by mouth once daily. 90 tablet 3    estradiol (ESTRACE) 0.01 % (0.1 mg/gram) vaginal cream Place 0.5 g vaginally twice a week. 42.5 g 11    glycerin adult suppository Place 1 suppository rectally as needed for Constipation. 50 suppository 0    losartan-hydrochlorothiazide 50-12.5 mg (HYZAAR) 50-12.5 mg per tablet TAKE 1 TABLET BY MOUTH DAILY. For high blood pressure 90 tablet 3    metoprolol succinate (TOPROL-XL) 25 MG 24 hr tablet Take 1 tablet (25 mg total) by mouth once daily. For high blood pressure and to slow the heart down 90 tablet 3    sertraline (ZOLOFT) 100 MG tablet Take 1 tablet (100 mg total) by mouth once daily. For anxiety 90 tablet 3    [DISCONTINUED] amLODIPine (NORVASC) 5 MG tablet Take 1 tablet (5 mg total) by mouth once daily. 90 tablet 2     No current facility-administered medications on file prior to visit.        Past Medical History:   Diagnosis Date    AF (paroxysmal atrial fibrillation) 6/22/2017    Anemia     Anxiety     Arthritis     Cataract     Disorder of kidney and ureter     Encounter for blood transfusion     Hypertension     Late onset Alzheimer's disease with behavioral disturbance 6/18/2019    OAB (overactive bladder) 8/22/2017    Osteoporosis     Polyuria     Thyroid disease        Family History   Problem Relation  Age of Onset    Arthritis Mother     Thyroid disease Mother     Heart attack Father     Diabetes Father     Liver disease Father     Arthritis Sister     Osteoporosis Sister     Hypertension Sister     Early death Brother     Asthma Son     Tuberculosis Maternal Grandmother     Early death Sister     Thyroid disease Other     Cervical cancer Neg Hx     Endometrial cancer Neg Hx     Vaginal cancer Neg Hx         reports that she has quit smoking. She has never used smokeless tobacco. She reports that she drinks about 1.8 oz of alcohol per week. She reports that she does not use drugs.    Review of Systems   Constitutional: Negative for activity change and unexpected weight change.   HENT: Negative for hearing loss, rhinorrhea and trouble swallowing.    Eyes: Negative for discharge and visual disturbance.   Respiratory: Negative for chest tightness and wheezing.    Cardiovascular: Positive for leg swelling. Negative for chest pain and palpitations.   Gastrointestinal: Negative for blood in stool, constipation, diarrhea and vomiting.   Endocrine: Negative for polydipsia and polyuria.   Genitourinary: Negative for difficulty urinating, dysuria, hematuria and menstrual problem.   Musculoskeletal: Negative for arthralgias, joint swelling and neck pain.   Neurological: Positive for weakness. Negative for headaches.   Psychiatric/Behavioral: Positive for confusion. Negative for dysphoric mood.       Objective:     Vitals:    08/22/19 1103   BP: 118/88   Pulse: 105   Resp: 16   Temp: 97.7 °F (36.5 °C)        Physical Exam   Constitutional: She appears well-developed. No distress.   HENT:   Head: Normocephalic and atraumatic.   Eyes: Conjunctivae are normal. Right eye exhibits no discharge. Left eye exhibits no discharge. No scleral icterus.   Cardiovascular: Normal rate. An irregularly irregular rhythm present. Exam reveals no gallop and no friction rub.   Murmur heard.  Pulmonary/Chest: Effort normal and  breath sounds normal. No respiratory distress. She has no wheezes. She has no rales.   Musculoskeletal: She exhibits edema (+ 3 up to the knees).   Neurological: She is alert.   Skin: She is not diaphoretic.   Light erythema and dry skin bilateral legs.    Psychiatric: She has a normal mood and affect. She is slowed. She is not agitated. Cognition and memory are impaired. She exhibits abnormal recent memory and abnormal remote memory.   Vitals reviewed.      Assessment:       1. Dysuria    2. Confusion    3. AF (paroxysmal atrial fibrillation)    4. Pedal edema        Plan:       Ana was seen today for dementia.    Diagnoses and all orders for this visit:    Dysuria  -     POCT urinalysis, dipstick or tablet reag  -     Urinalysis  -     Urine culture  Pt is a poor historian, but she seems to be having dysuria from what I and her son who was with her can tell. Urine studies today.     Confusion  Worsening - slight decrease in her chronic condition.     AF (paroxysmal atrial fibrillation)  -     Comprehensive metabolic panel; Future  -     CBC auto differential; Future  -     Brain natriuretic peptide; Future  Patient with AFib on exam today with worsening pedal edema has been concern for possible worsening diastolic dysfunction.  Will obtain labs as above.    Pedal edema  Pt and I discussed the frustrating nature of lower extremity edema, the lack of a definitive cure, and cumbersome treatments that do not work very well. I advised her to use above the knee compression stockings put on first thing in the am, to elevate her feet when sitting, and to engage in exercise with the ultimate goal of weightloss. I also advised proper skin care and use of a good emollient to keep skin barrier intact.     Pt to inform me if they develop skin break down, erythema, fevers, or chills, or if she has one leg swelling more than the other.            Follow up in about 4 weeks (around 9/19/2019) for Establish care in 4 weeks  Cookie.        Pt verbalized understanding and agreed with our plan.

## 2019-08-23 ENCOUNTER — TELEPHONE (OUTPATIENT)
Dept: FAMILY MEDICINE | Facility: CLINIC | Age: 84
End: 2019-08-23

## 2019-08-23 DIAGNOSIS — E87.70 HYPERVOLEMIA, UNSPECIFIED HYPERVOLEMIA TYPE: Primary | ICD-10-CM

## 2019-08-23 DIAGNOSIS — N39.0 ACUTE UTI: ICD-10-CM

## 2019-08-23 RX ORDER — SULFAMETHOXAZOLE AND TRIMETHOPRIM 800; 160 MG/1; MG/1
1 TABLET ORAL 2 TIMES DAILY
Qty: 6 TABLET | Refills: 0 | Status: SHIPPED | OUTPATIENT
Start: 2019-08-23 | End: 2019-08-26

## 2019-08-23 RX ORDER — FUROSEMIDE 20 MG/1
20 TABLET ORAL 2 TIMES DAILY
Qty: 60 TABLET | Refills: 11 | Status: SHIPPED | OUTPATIENT
Start: 2019-08-23 | End: 2020-08-22

## 2019-08-23 NOTE — TELEPHONE ENCOUNTER
hermann from [facility] where pt is living now called to let us know:  In the future, pt prescriptions need to go to [facility] pharmacy, and all care changes have to be signed by physician and faxed over.

## 2019-08-23 NOTE — TELEPHONE ENCOUNTER
----- Message from Soo Lopez sent at 8/23/2019  2:12 PM CDT -----  Contact: Lew [phone]  Type: Patient Call Back    Who called: Lew    What is the request in detail: [facility] where the patient is a resident at is requesting that the orders for the patient new prescription be sent over to them at [phone] and the physician also needs to sign the medications. Patient pharmacy has changed to [facility] [phone]. Also needs to clarify if the patient needs to continue use of the estrace cream.    Can the clinic reply by [facility]? Call back    Would the patient rather a call back or a response via [facility]? Call back    Best call back number:[phone]

## 2019-08-23 NOTE — TELEPHONE ENCOUNTER
Please inform patient's son LEONEL that his mother's blood work does suggest that she may be suffering from heart failure which could explain her swelling both legs.  Two things will be done concerning this.  Please have patient's son call Dr. Garcia is office for an appointment within the next few weeks.  In case the referral is needed I have placed one.  Also, I have placed an order for furosemide which is the fluid pill that we were discussing during her last visit which will help get rid of the excess fluid. Her kidney function will need to be monitored. In addition, please ensure that the nursing home is checking her blood pressure daily as it may drop her blood pressure.     Her urine test also suggest that she has urinary tract infection so I have sent antibiotics in as well.    Please have patient notify me or her new PCP she develops any chest pain shortness of breath heart racing hernia concerning symptoms.     Please give him ref coordinator number in case a referral is needed back to Dr Garcia.

## 2019-08-23 NOTE — TELEPHONE ENCOUNTER
Spoke to pts son and informed of information below; I also gave him phone number to Dr Garcia office to call and schedule appt

## 2019-08-26 ENCOUNTER — TELEPHONE (OUTPATIENT)
Dept: FAMILY MEDICINE | Facility: CLINIC | Age: 84
End: 2019-08-26

## 2019-08-26 ENCOUNTER — HOSPITAL ENCOUNTER (EMERGENCY)
Facility: HOSPITAL | Age: 84
Discharge: HOSPICE | End: 2019-08-26
Attending: EMERGENCY MEDICINE
Payer: MEDICARE

## 2019-08-26 VITALS
WEIGHT: 140 LBS | TEMPERATURE: 98 F | BODY MASS INDEX: 22.5 KG/M2 | OXYGEN SATURATION: 96 % | SYSTOLIC BLOOD PRESSURE: 116 MMHG | DIASTOLIC BLOOD PRESSURE: 83 MMHG | RESPIRATION RATE: 16 BRPM | HEART RATE: 110 BPM | HEIGHT: 66 IN

## 2019-08-26 DIAGNOSIS — S80.00XA CONTUSION OF KNEE, UNSPECIFIED LATERALITY, INITIAL ENCOUNTER: ICD-10-CM
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
## 2019-08-26 DIAGNOSIS — S70.02XA CONTUSION OF LEFT HIP, INITIAL ENCOUNTER: ICD-10-CM

## 2019-08-26 DIAGNOSIS — R52 PAIN: ICD-10-CM

## 2019-08-26 LAB — BACTERIA UR CULT: ABNORMAL

## 2019-08-26 PROCEDURE — 99284 EMERGENCY DEPT VISIT MOD MDM: CPT | Mod: 25

## 2019-08-26 RX ORDER — AMLODIPINE BESYLATE 5 MG/1
5 TABLET ORAL DAILY
COMMUNITY

## 2019-08-26 NOTE — TELEPHONE ENCOUNTER
----- Message from Valorie Rodrigez sent at 8/26/2019 10:59 AM CDT -----  Contact: Connie villagomez  Type: Patient Call Back    What is the request in detail: [facility] calling to speak to a nurse regarding pt fell and now has swelling on right knee and leg.      Can the clinic reply by [facility]? No    Would the patient rather a call back or a response via [facility]? Call back     Best call back number: [phone]

## 2019-08-26 NOTE — ED TRIAGE NOTES
Patient with dementia and poor historian. States she fell but unsure of details of how. Denies pain anywhere. Good PROM to all extremities. Light bruises to bilateral knees. Son at bedside. Dr. Morrow in to see patient

## 2019-08-26 NOTE — TELEPHONE ENCOUNTER
Swelling to right leg after fall this morning, along with a bruise, patient advised to go to urgent care or ED for eval

## 2019-08-26 NOTE — ED PROVIDER NOTES
Encounter Date: 8/26/2019    SCRIBE #1 NOTE: I, Malinda Anthony am scribing for, and in the presence of,  Lucho Morrow MD. I have scribed the following portions of the note - Other sections scribed: HPI, ROS, PE.       History     Chief Complaint   Patient presents with    Fall     Per EMS, pt fell at nursing home and presents with swelling to the RLE (2+ swelling noted to affected area)     CC: Fall  Patient arrived via EMS    HPI: This 88 y.o female who has HTN, Atrial fibrillation, Late onset Alzheimer's disease, and Osteoporosis presents to the ED for an evaluation for right lower extremity swelling s/p a fall this morning. Patient also complains of pain to the sacral/tailbone region. Patient's son reports the patient currently resides in an assisted living facility and states she was sent to be evaluated after staff at the nursing home noticed the swelling.  Son states the nursing staff contacted the patient's PCP who advised that she come for an evaluation. Patient reports she can recall the fall this morning and states she was lowered to her knees during the fall.  She also reports needing assistance after the fall to get up. Patient's son reports the lower extremity swelling has been occurring for the past 2 weeks and reports it to the bilateral lower extremities.  He reports the lower extremity swelling has been addressed by the patient's PCP and reports her being diagnosed with CHF.  He reports the patient is currently prescribed Lasix, but has not noticed a change in the swelling since starting the medication.  Her son reports the patient currently has a follow up appointment scheduled with her PCP for this.  He also reports the patient is currently being treated with antibiotics for a UTI.  Son states this is her second fall and states she has been complaining of pain to her tailbone since her first fall a few weeks ago.  He reports the patient has not been evaluated for the tailbone pain.  Patient's  son reports of the patient having bilateral knee replacement.  No prior tx.  No alleviating factors.    The history is provided by the patient and a relative. No  was used.     Review of patient's allergies indicates:   Allergen Reactions    Codeine     Darvocet a500 [propoxyphene n-acetaminophen]     Ibuprofen      Past Medical History:   Diagnosis Date    AF (paroxysmal atrial fibrillation) 6/22/2017    Anemia     Anxiety     Arthritis     Cataract     Disorder of kidney and ureter     Encounter for blood transfusion     Hypertension     Late onset Alzheimer's disease with behavioral disturbance 6/18/2019    OAB (overactive bladder) 8/22/2017    Osteoporosis     Polyuria     Thyroid disease      Past Surgical History:   Procedure Laterality Date    ADENOIDECTOMY      CATARACT EXTRACTION      HYSTERECTOMY N/A 1965    KNEE SURGERY      TONSILLECTOMY       Family History   Problem Relation Age of Onset    Arthritis Mother     Thyroid disease Mother     Heart attack Father     Diabetes Father     Liver disease Father     Arthritis Sister     Osteoporosis Sister     Hypertension Sister     Early death Brother     Asthma Son     Tuberculosis Maternal Grandmother     Early death Sister     Thyroid disease Other     Cervical cancer Neg Hx     Endometrial cancer Neg Hx     Vaginal cancer Neg Hx      Social History     Tobacco Use    Smoking status: Former Smoker    Smokeless tobacco: Never Used   Substance Use Topics    Alcohol use: Yes     Alcohol/week: 1.8 oz     Types: 3 Glasses of wine per week     Frequency: 2-4 times a month     Drinks per session: 1 or 2     Binge frequency: Never    Drug use: No     Review of Systems   Constitutional: Negative for chills and fever.   HENT: Negative for congestion, ear pain, rhinorrhea and sore throat.    Eyes: Negative for pain and visual disturbance.   Respiratory: Negative for cough and shortness of breath.     Cardiovascular: Positive for leg swelling. Negative for chest pain.   Gastrointestinal: Negative for abdominal pain, diarrhea, nausea and vomiting.   Genitourinary: Negative for dysuria.   Musculoskeletal: Negative for back pain and neck pain.        (+) sacral pain   Skin: Negative for rash.   Neurological: Negative for headaches.       Physical Exam     Initial Vitals [08/26/19 1536]   BP Pulse Resp Temp SpO2   (!) 140/85 110 16 97.9 °F (36.6 °C) 96 %      MAP       --         Physical Exam    Constitutional: She appears well-developed and well-nourished. She is not diaphoretic. No distress.   HENT:   Head: Normocephalic and atraumatic.   Mouth/Throat: Oropharynx is clear and moist.   Eyes: Pupils are equal, round, and reactive to light.   Neck: Neck supple.   Cardiovascular: Normal rate, regular rhythm and normal heart sounds.   Pulmonary/Chest: Breath sounds normal.   Abdominal: Soft. There is no tenderness.   Musculoskeletal: She exhibits edema.   Patient has tenderness to the coccyx.     Neurological: She is alert.   Patient has a GCS 14.  Patient has confusion, which family reports is the patient's baseline.     Skin: Skin is warm and dry.   Patient has bruising to the left lateral hip. Patient has bruising to the anterior aspect of the bilateral knees.  Patient has well healed surgical scars to the anterior knee consistent with knee replacement.   Psychiatric: She has a normal mood and affect.         ED Course   Procedures  Labs Reviewed - No data to display       Imaging Results          X-Ray Knee 1 or 2 View Left (Final result)  Result time 08/26/19 17:05:49    Final result by Boni Yarbrough MD (08/26/19 17:05:49)                 Impression:      Status post left knee arthroplasty.      Electronically signed by: Boni Yarbrough MD  Date:    08/26/2019  Time:    17:05             Narrative:    EXAMINATION:  XR KNEE 1 OR 2 VIEW LEFT    CLINICAL HISTORY:  Pain, unspecified    TECHNIQUE:  Two views of the  left knee    COMPARISON:  None    FINDINGS:  Views of the left knee demonstrate left knee arthroplasty.  Prosthetic components superior intact with good alignment.  No joint effusion is seen.  There is no new fracture identified.  Vascular calcifications are present.                               X-Ray Knee 1 or 2 View Right (Final result)  Result time 08/26/19 17:04:57    Final result by Boni Yarbrough MD (08/26/19 17:04:57)                 Narrative:    EXAMINATION:  XR KNEE 1 OR 2 VIEW RIGHT    CLINICAL HISTORY:  pain s/p fall;    TECHNIQUE:  AP and lateral views of the right knee were performed.    COMPARISON:  Right knee films demonstrate presence of and knee prosthesis with femoral and tibial components having a satisfactory appearance.  There is no new fracture identified.  No joint effusion is seen.    FINDINGS:  Postoperative changes from right knee arthroplasty.      Electronically signed by: Boni Yarbrough MD  Date:    08/26/2019  Time:    17:04                             X-Ray Hip 2 View Left (Final result)  Result time 08/26/19 17:08:56    Final result by Boni Yarbrough MD (08/26/19 17:08:56)                 Impression:      Degenerative changes at the hip joints, left greater than right      Electronically signed by: Boni Yarbrough MD  Date:    08/26/2019  Time:    17:08             Narrative:    EXAMINATION:  XR HIP 2 VIEW LEFT    CLINICAL HISTORY:  Pain, unspecified    TECHNIQUE:  AP view of the pelvis and frog leg lateral view of the left hip were performed.    COMPARISON:  02/05/2018    FINDINGS:  X-ray of the pelvis and left hip show no evidence of fracture.  There is advanced degenerative change and joint space loss in hip joints bilaterally, worse on the left. Calcified mass in the central pelvis likely represents a calcified fibroid. Numerous injection granuloma are seen in the buttock soft tissues. Vascular calcifications are present, and there is degenerative change in lumbar spine. No  acute fracture is seen.                               X-Ray Sacrum And Coccyx (Final result)  Result time 08/26/19 17:27:11    Final result by Elder Moreira MD (08/26/19 17:27:11)                 Impression:      1. Secondary to positioning, examination is of limited diagnostic utility.  The sacral segments appear grossly aligned, correlation is advised.      Electronically signed by: Elder Moreira MD  Date:    08/26/2019  Time:    17:27             Narrative:    EXAMINATION:  XR SACRUM AND COCCYX    CLINICAL HISTORY:  Pain, unspecified    TECHNIQUE:  Two or three views of the sacrum and coccyx were performed.    COMPARISON:  08/26/2019    FINDINGS:  Single-view.    Please note, single-view is submitted for review, oblique, and of limited diagnostic quality.  Degenerative changes are noted of the spine.  There is degenerative change about the hips.  The sacral segments appear grossly aligned.  There is a dystrophic calcification in the pelvis.  There is atherosclerotic calcification of the aorta.                            \  Patient presents status post mechanical fall.  Bilateral knee contusions and left hip contusion.  X-ray shows no abnormalities.  Patient stable for discharge.                Scribe Attestation:   Scribe #1: I performed the above scribed service and the documentation accurately describes the services I performed. I attest to the accuracy of the note.    I, Lucho Morrow, personally performed the services described in this documentation. All medical record entries made by the scribe were at my direction and in my presence.  I have reviewed the chart and agree that the record reflects my personal performance and is accurate and complete.           Clinical Impression:       ICD-10-CM ICD-9-CM   1. Fall, initial encounter W19.XXXA E888.9   2. Pain R52 780.96   3. Contusion of knee, unspecified laterality, initial encounter S80.00XA 924.11   4. Contusion of left hip, initial encounter  S70.02XA 924.01                                Lucho Morrow MD  08/27/19 2334

## 2019-09-09 ENCOUNTER — PATIENT OUTREACH (OUTPATIENT)
Dept: ADMINISTRATIVE | Facility: HOSPITAL | Age: 84
End: 2019-09-09

## 2019-09-09 ENCOUNTER — OFFICE VISIT (OUTPATIENT)
Dept: CARDIOLOGY | Facility: CLINIC | Age: 84
End: 2019-09-09
Payer: MEDICARE

## 2019-09-09 VITALS
HEART RATE: 93 BPM | WEIGHT: 138.88 LBS | OXYGEN SATURATION: 95 % | SYSTOLIC BLOOD PRESSURE: 114 MMHG | HEIGHT: 66 IN | DIASTOLIC BLOOD PRESSURE: 64 MMHG | BODY MASS INDEX: 22.32 KG/M2

## 2019-09-09 DIAGNOSIS — F02.818 LATE ONSET ALZHEIMER'S DISEASE WITH BEHAVIORAL DISTURBANCE: Primary | ICD-10-CM

## 2019-09-09 DIAGNOSIS — I10 HYPERTENSION: ICD-10-CM

## 2019-09-09 DIAGNOSIS — G30.1 LATE ONSET ALZHEIMER'S DISEASE WITH BEHAVIORAL DISTURBANCE: Primary | ICD-10-CM

## 2019-09-09 DIAGNOSIS — I48.0 AF (PAROXYSMAL ATRIAL FIBRILLATION): ICD-10-CM

## 2019-09-09 DIAGNOSIS — I10 HYPERTENSION, WELL CONTROLLED: ICD-10-CM

## 2019-09-09 PROCEDURE — 99999 PR PBB SHADOW E&M-EST. PATIENT-LVL IV: CPT | Mod: PBBFAC,,, | Performed by: INTERNAL MEDICINE

## 2019-09-09 PROCEDURE — 99214 PR OFFICE/OUTPT VISIT, EST, LEVL IV, 30-39 MIN: ICD-10-PCS | Mod: S$PBB,,, | Performed by: INTERNAL MEDICINE

## 2019-09-09 PROCEDURE — 93005 ELECTROCARDIOGRAM TRACING: CPT | Mod: PBBFAC | Performed by: INTERNAL MEDICINE

## 2019-09-09 PROCEDURE — 93010 ELECTROCARDIOGRAM REPORT: CPT | Mod: S$PBB,,, | Performed by: INTERNAL MEDICINE

## 2019-09-09 PROCEDURE — 99999 PR PBB SHADOW E&M-EST. PATIENT-LVL IV: ICD-10-PCS | Mod: PBBFAC,,, | Performed by: INTERNAL MEDICINE

## 2019-09-09 PROCEDURE — 99214 OFFICE O/P EST MOD 30 MIN: CPT | Mod: S$PBB,,, | Performed by: INTERNAL MEDICINE

## 2019-09-09 PROCEDURE — 99214 OFFICE O/P EST MOD 30 MIN: CPT | Mod: PBBFAC,25 | Performed by: INTERNAL MEDICINE

## 2019-09-09 PROCEDURE — 93010 EKG 12-LEAD: ICD-10-PCS | Mod: S$PBB,,, | Performed by: INTERNAL MEDICINE

## 2019-09-09 NOTE — PROGRESS NOTES
Subjective:    Patient ID:  Ana Johnston is a 88 y.o. female who presents for follow-up of Atrial Fibrillation      HPI     Last saw me 8/2017  HTN, PAF -  alone at patient request     Recently moved from [place]  Referred by Dr Lemus  'hot flash' - pt experiences this 1-2 x per month x 5-10 seconds. This does not greatly trouble her.      IBS-D - Pt was diagnosed 10+ years ago. Lomotil does help, but alcohol worsens her sxs, but not consistently. This negatively affects her lifestyle.  She drinks a glass of wine about 2-3 x per week.      Positive depression screen - pt denies depressed, but does have rare anxiety. This does not bother her typically. If it does bother her, she will let me know.      Denies CP or SOB  Reports PAF was Dx about 4-5 years ago. Old records show holter 2013 with A-fib     Discussed OAC - she would prefer  qd alone     Echo 7/26/17    1 - Normal left ventricular systolic function (EF 55-60%).     2 - No wall motion abnormalities.     3 - Concentric hypertrophy.     4 - Impaired LV relaxation, elevated LAP (grade 2 diastolic dysfunction).     5 - Trivial to mild mitral regurgitation (eccentric, anteriorly directed jet, degree of MR may be underestimated).     6 - Mild tricuspid regurgitation.     7 - Pulmonary hypertension. The estimated PA systolic pressure is 65 mmHg.      Holter 7/26/17  1. Sinus rhythm with heart rates varying between 34 and 126 bpm with an average of 67 bpm.     VENTRICULAR ARRHYTHMIAS  1. There were very frequent PVCs totalling 36200 and averaging 463 per hour.     2. There were no episodes of ventricular tachycardia.    SUPRA VENTRICULAR ARRHYTHMIAS  1. There were very frequent PACs totalling 5238 and averaging 218 per hour.     2. There were no episodes of sustained supraventricular tachycardia.    SINUS NODE FUNCTION  1. There was no evidence of high grade SA desean block.     AV CONDUCTION  1. There was no evidence of high grade AV block.       8/2/17 Denies CP or SOB  Denies palpitations  Cardiac stable  Frequent PVCs and PACs but asymptomatic with good LV function  OV 6 months    In assisted living in [place]  Denies CP  Stable WRIGHT  Worsening LE edema - norvasc recently stopped by PCP  EKG A-fib 100 NSSTT changes      Review of Systems   Constitution: Negative for decreased appetite.   HENT: Negative for ear discharge.    Eyes: Negative for blurred vision.   Respiratory: Negative for hemoptysis.    Endocrine: Negative for polyphagia.   Hematologic/Lymphatic: Negative for adenopathy.   Skin: Negative for color change.   Musculoskeletal: Negative for joint swelling.   Genitourinary: Negative for bladder incontinence.   Neurological: Negative for brief paralysis.   Psychiatric/Behavioral: Negative for hallucinations.   Allergic/Immunologic: Negative for hives.        Objective:    Physical Exam   Constitutional: She is oriented to person, place, and time. She appears well-developed and well-nourished.   HENT:   Head: Normocephalic and atraumatic.   Eyes: Pupils are equal, round, and reactive to light. Conjunctivae are normal.   Neck: Normal range of motion. Neck supple.   Cardiovascular: Normal rate, normal heart sounds and intact distal pulses. An irregularly irregular rhythm present.   Pulmonary/Chest: Effort normal and breath sounds normal.   Abdominal: Soft. Bowel sounds are normal.   Musculoskeletal: Normal range of motion. She exhibits edema.   Neurological: She is alert and oriented to person, place, and time.   Skin: Skin is warm and dry.         Assessment:       1. Late onset Alzheimer's disease with behavioral disturbance    2. AF (paroxysmal atrial fibrillation)    3. Hypertension, well controlled         Plan:       A-fib rates appear controlled  Ok to double lasix for a few doses until LE edema improved  Discussed a repeat echo - they decline  OV 6 months

## 2019-09-09 NOTE — LETTER
September 9, 2019      Tc Lemus MD  [address] [facility] [address]           [facility]iology  [address] [facility] [address]  [facility] [address]  Phone: [phone]          Patient: Ana Johnston   MR Number: [id number]   YOB: 1930   Date of Visit: 9/9/2019       Dear Dr. Tc Lemus:    Thank you for referring Ana Johnston to me for evaluation. Attached you will find relevant portions of my assessment and plan of care.    If you have questions, please do not hesitate to call me. I look forward to following Ana Johnston along with you.    Sincerely,    Junior Garcia MD    Enclosure  CC:  No Recipients    If you would like to receive this communication electronically, please contact [email or web address] or [phone] to request more information on [employer] access.    For providers and/or their staff who would like to refer a patient to [facility], please contact us through our one-stop-shop provider referral line, [facility] , at [phone].    If you feel you have received this communication in error or would no longer like to receive these types of communications, please e-mail [email or web address]

## 2019-09-23 ENCOUNTER — OFFICE VISIT (OUTPATIENT)
Dept: FAMILY MEDICINE | Facility: CLINIC | Age: 84
End: 2019-09-23
Payer: MEDICARE

## 2019-09-23 VITALS
BODY MASS INDEX: 21.9 KG/M2 | RESPIRATION RATE: 20 BRPM | HEART RATE: 99 BPM | OXYGEN SATURATION: 98 % | HEIGHT: 66 IN | SYSTOLIC BLOOD PRESSURE: 100 MMHG | DIASTOLIC BLOOD PRESSURE: 68 MMHG | TEMPERATURE: 99 F | WEIGHT: 136.25 LBS

## 2019-09-23 DIAGNOSIS — M06.4 INFLAMMATORY POLYARTHROPATHY: ICD-10-CM

## 2019-09-23 DIAGNOSIS — R30.0 DYSURIA: Primary | ICD-10-CM

## 2019-09-23 DIAGNOSIS — Z23 NEED FOR INFLUENZA VACCINATION: ICD-10-CM

## 2019-09-23 DIAGNOSIS — N18.30 CKD (CHRONIC KIDNEY DISEASE) STAGE 3, GFR 30-59 ML/MIN: ICD-10-CM

## 2019-09-23 DIAGNOSIS — F41.9 ANXIETY: ICD-10-CM

## 2019-09-23 LAB
BILIRUB SERPL-MCNC: NEGATIVE MG/DL
BLOOD URINE, POC: NEGATIVE
COLOR, POC UA: ABNORMAL
GLUCOSE UR QL STRIP: NORMAL
KETONES UR QL STRIP: NEGATIVE
LEUKOCYTE ESTERASE URINE, POC: ABNORMAL
NITRITE, POC UA: NEGATIVE
PH, POC UA: 6
PROTEIN, POC: ABNORMAL
SPECIFIC GRAVITY, POC UA: 1.01
UROBILINOGEN, POC UA: NORMAL

## 2019-09-23 PROCEDURE — 99214 PR OFFICE/OUTPT VISIT, EST, LEVL IV, 30-39 MIN: ICD-10-PCS | Mod: S$PBB,,, | Performed by: FAMILY MEDICINE

## 2019-09-23 PROCEDURE — 99999 PR PBB SHADOW E&M-EST. PATIENT-LVL III: CPT | Mod: PBBFAC,,, | Performed by: FAMILY MEDICINE

## 2019-09-23 PROCEDURE — 90662 IIV NO PRSV INCREASED AG IM: CPT | Mod: PBBFAC,PO

## 2019-09-23 PROCEDURE — 99999 PR PBB SHADOW E&M-EST. PATIENT-LVL III: ICD-10-PCS | Mod: PBBFAC,,, | Performed by: FAMILY MEDICINE

## 2019-09-23 PROCEDURE — 99214 OFFICE O/P EST MOD 30 MIN: CPT | Mod: S$PBB,,, | Performed by: FAMILY MEDICINE

## 2019-09-23 PROCEDURE — 99213 OFFICE O/P EST LOW 20 MIN: CPT | Mod: PBBFAC,PO,25 | Performed by: FAMILY MEDICINE

## 2019-09-23 PROCEDURE — 81002 URINALYSIS NONAUTO W/O SCOPE: CPT | Mod: PBBFAC,PO | Performed by: FAMILY MEDICINE

## 2019-09-23 RX ORDER — SERTRALINE HYDROCHLORIDE 100 MG/1
150 TABLET, FILM COATED ORAL DAILY
Qty: 180 TABLET | Refills: 1 | Status: SHIPPED | OUTPATIENT
Start: 2019-09-23

## 2019-09-23 RX ORDER — SULFAMETHOXAZOLE AND TRIMETHOPRIM 800; 160 MG/1; MG/1
1 TABLET ORAL 2 TIMES DAILY
Qty: 10 TABLET | Refills: 0 | Status: SHIPPED | OUTPATIENT
Start: 2019-09-23 | End: 2019-09-28

## 2019-09-23 NOTE — PROGRESS NOTES
Subjective:       Patient ID: Ana Johnston is a 89 y.o. female.    Chief Complaint: Abdominal Pain    HPI   88 yo female presents to establish care. Is accompanied by her son. Pt has dementia so most of the hx was gathered by her son. Pt has hx of vaginal issues that she follows w/ her urogyn. Pt complains of ab pain. Has dysuria and incontinence. Pt's son is concerned about the decrease in mentation since her   on 3/2019. Feels she has progressively worsened quicker.     Review of Systems   Constitutional: Negative.    HENT: Negative.    Respiratory: Negative.    Cardiovascular: Negative.    Gastrointestinal: Negative.    Endocrine: Negative.    Genitourinary: Positive for difficulty urinating and dysuria.   Musculoskeletal: Negative.    Neurological: Negative.    Psychiatric/Behavioral: Negative.           Past Medical History:   Diagnosis Date    AF (paroxysmal atrial fibrillation) 2017    Anemia     Anxiety     Arthritis     Cataract     Disorder of kidney and ureter     Encounter for blood transfusion     Hypertension     Late onset Alzheimer's disease with behavioral disturbance 2019    OAB (overactive bladder) 2017    Osteoporosis     Polyuria     Thyroid disease      Past Surgical History:   Procedure Laterality Date    ADENOIDECTOMY      CATARACT EXTRACTION      HYSTERECTOMY N/A     KNEE SURGERY      TONSILLECTOMY       Family History   Problem Relation Age of Onset    Arthritis Mother     Thyroid disease Mother     Heart attack Father     Diabetes Father     Liver disease Father     Arthritis Sister     Osteoporosis Sister     Hypertension Sister     Early death Brother     Asthma Son     Tuberculosis Maternal Grandmother     Early death Sister     Thyroid disease Other     Cervical cancer Neg Hx     Endometrial cancer Neg Hx     Vaginal cancer Neg Hx      Social History     Socioeconomic History    Marital status:      Spouse name: Not  on file    Number of children: Not on file    Years of education: Not on file    Highest education level: Not on file   Occupational History    Not on file   Social Needs    Financial resource strain: Somewhat hard    Food insecurity:     Worry: Never true     Inability: Never true    Transportation needs:     Medical: No     Non-medical: No   Tobacco Use    Smoking status: Former Smoker    Smokeless tobacco: Never Used   Substance and Sexual Activity    Alcohol use: Yes     Alcohol/week: 3.0 standard drinks     Types: 3 Glasses of wine per week     Frequency: 2-4 times a month     Drinks per session: 1 or 2     Binge frequency: Never    Drug use: No    Sexual activity: Not Currently     Partners: Male     Comment: 5/27/17  58 years   Lifestyle    Physical activity:     Days per week: 0 days     Minutes per session: 0 min    Stress: Rather much   Relationships    Social connections:     Talks on phone: More than three times a week     Gets together: More than three times a week     Attends Sabianism service: Not on file     Active member of club or organization: No     Attends meetings of clubs or organizations: Never     Relationship status:    Other Topics Concern    Not on file   Social History Narrative    Not on file       Current Outpatient Medications:     acetaminophen (TYLENOL) 325 MG tablet, Take 1 tablet (325 mg total) by mouth 2 (two) times daily as needed., Disp: 180 tablet, Rfl: 3    aspirin 325 MG tablet, Take 1 tablet (325 mg total) by mouth once daily., Disp: 90 tablet, Rfl: 3    estradiol (ESTRACE) 0.01 % (0.1 mg/gram) vaginal cream, Place 0.5 g vaginally twice a week., Disp: 42.5 g, Rfl: 11    furosemide (LASIX) 20 MG tablet, Take 1 tablet (20 mg total) by mouth 2 (two) times daily., Disp: 60 tablet, Rfl: 11    glycerin adult suppository, Place 1 suppository rectally as needed for Constipation., Disp: 50 suppository, Rfl: 0    losartan-hydrochlorothiazide  "50-12.5 mg (HYZAAR) 50-12.5 mg per tablet, TAKE 1 TABLET BY MOUTH DAILY. For high blood pressure, Disp: 90 tablet, Rfl: 3    metoprolol succinate (TOPROL-XL) 25 MG 24 hr tablet, Take 1 tablet (25 mg total) by mouth once daily. For high blood pressure and to slow the heart down, Disp: 90 tablet, Rfl: 3    sertraline (ZOLOFT) 100 MG tablet, Take 1.5 tablets (150 mg total) by mouth once daily. For anxiety, Disp: 180 tablet, Rfl: 1    amLODIPine (NORVASC) 5 MG tablet, Take 5 mg by mouth once daily., Disp: , Rfl:     sulfamethoxazole-trimethoprim 800-160mg (BACTRIM DS) 800-160 mg Tab, Take 1 tablet by mouth 2 (two) times daily. for 5 days, Disp: 10 tablet, Rfl: 0   Objective:      Vitals:    09/23/19 1001   BP: 100/68   BP Location: Left arm   Patient Position: Sitting   BP Method: Medium (Manual)   Pulse: 99   Resp: 20   Temp: 98.5 °F (36.9 °C)   TempSrc: Oral   SpO2: 98%   Weight: 61.8 kg (136 lb 3.9 oz)   Height: 5' 6" (1.676 m)       Physical Exam   Constitutional: She is oriented to person, place, and time. No distress.   HENT:   Head: Normocephalic and atraumatic.   Eyes: Conjunctivae are normal.   Neck: Neck supple.   Cardiovascular: Normal rate, regular rhythm and normal heart sounds. Exam reveals no gallop and no friction rub.   No murmur heard.  Pulmonary/Chest: Effort normal and breath sounds normal. She has no wheezes. She has no rales.   Neurological: She is alert and oriented to person, place, and time.   Skin: Skin is warm and dry.   Psychiatric: She has a normal mood and affect. Her behavior is normal. Judgment and thought content normal.          Assessment:       1. Dysuria    2. Need for influenza vaccination    3. Anxiety    4. Inflammatory polyarthropathy    5. CKD (chronic kidney disease) stage 3, GFR 30-59 ml/min        Plan:       Dysuria  - Dip showed 2+ leuk. Unable to get culture. Will send as home collect. Sent bactrim  -     POCT URINE DIPSTICK WITHOUT MICROSCOPE  -     " sulfamethoxazole-trimethoprim 800-160mg (BACTRIM DS) 800-160 mg Tab; Take 1 tablet by mouth 2 (two) times daily. for 5 days  Dispense: 10 tablet; Refill: 0  -     Urine culture; Future; Expected date: 09/23/2019    Need for influenza vaccination  -     Influenza - High Dose (65+) (PF) (IM)    Anxiety  - Increased zoloft. Could be more depressed causing worsening dementia   -     sertraline (ZOLOFT) 100 MG tablet; Take 1.5 tablets (150 mg total) by mouth once daily. For anxiety  Dispense: 180 tablet; Refill: 1    Inflammatory polyarthropathy  Continue tylenol    CKD (chronic kidney disease) stage 3, GFR 30-59 ml/min  Advised hydration    Follow up in about 3 months (around 12/23/2019).            Kendrick Gary MD

## 2019-11-05 ENCOUNTER — TELEPHONE (OUTPATIENT)
Dept: FAMILY MEDICINE | Facility: CLINIC | Age: 84
End: 2019-11-05

## 2019-11-05 NOTE — TELEPHONE ENCOUNTER
----- Message from Radha Boyle sent at 11/4/2019  6:26 PM CST -----  Contact: Lulú/ [facility]   Lulú is requesting a call back  regards to the Patient    Lulú states the the patient is not eating,  drinking or sleeping    Please call    Phone [phone]

## 2021-04-30 NOTE — TELEPHONE ENCOUNTER
Called Pt son and scheduled an appointment for a new consult visit for OAB/ urinary frequency. Pt scheduled for 9/7/17 at [facility] as requested by son. He voiced understanding and call was ended.   normal